# Patient Record
Sex: MALE | Race: WHITE | NOT HISPANIC OR LATINO | RURAL
[De-identification: names, ages, dates, MRNs, and addresses within clinical notes are randomized per-mention and may not be internally consistent; named-entity substitution may affect disease eponyms.]

---

## 2017-02-21 ENCOUNTER — OFFICE VISIT (OUTPATIENT)
Dept: CARDIOLOGY | Facility: CLINIC | Age: 77
End: 2017-02-21

## 2017-02-21 VITALS
HEIGHT: 71 IN | HEART RATE: 61 BPM | DIASTOLIC BLOOD PRESSURE: 79 MMHG | SYSTOLIC BLOOD PRESSURE: 190 MMHG | WEIGHT: 208.4 LBS | BODY MASS INDEX: 29.18 KG/M2

## 2017-02-21 DIAGNOSIS — I25.10 CORONARY ARTERY DISEASE INVOLVING NATIVE CORONARY ARTERY OF NATIVE HEART WITHOUT ANGINA PECTORIS: Primary | ICD-10-CM

## 2017-02-21 DIAGNOSIS — E78.5 DYSLIPIDEMIA: ICD-10-CM

## 2017-02-21 DIAGNOSIS — I10 ESSENTIAL HYPERTENSION: ICD-10-CM

## 2017-02-21 DIAGNOSIS — I42.9 CARDIOMYOPATHY (HCC): ICD-10-CM

## 2017-02-21 PROCEDURE — 99213 OFFICE O/P EST LOW 20 MIN: CPT | Performed by: INTERNAL MEDICINE

## 2017-02-21 RX ORDER — LISINOPRIL 20 MG/1
20 TABLET ORAL 2 TIMES DAILY
Qty: 60 TABLET | Refills: 6 | Status: SHIPPED | OUTPATIENT
Start: 2017-02-21 | End: 2017-09-20 | Stop reason: SDUPTHER

## 2017-02-21 RX ORDER — CARVEDILOL 25 MG/1
25 TABLET ORAL 2 TIMES DAILY
Qty: 60 TABLET | Refills: 6 | Status: SHIPPED | OUTPATIENT
Start: 2017-02-21 | End: 2017-09-20 | Stop reason: SDUPTHER

## 2017-02-21 RX ORDER — ATORVASTATIN CALCIUM 40 MG/1
40 TABLET, FILM COATED ORAL DAILY
Qty: 30 TABLET | Refills: 6 | Status: SHIPPED | OUTPATIENT
Start: 2017-02-21 | End: 2017-10-04 | Stop reason: SDUPTHER

## 2017-02-21 NOTE — PROGRESS NOTES
Encounter Date:02/21/2017      Patient ID: Zachary Melendez is a 76 y.o. male.    Chief Complaint: Coronary Artery Disease; Hypertension; and Hyperlipidemia    PROBLEM LIST:  1. Coronary artery disease  a. NSTEMI May 1st, 2016.  b. C 5/2/16: 80-90% LAD, 50% Cx with NL IFR, 30% RCA, EF 45%.  c. Stent of LAD with 2.5 x 18 FERNANDO  2. Cardiomyopathy  3. Hypertension  4. Dyslipidemia  5. GERD  6. Obesity  7. Nephrolothiasis  8. Surgical Hx:  a. TURP  b. Bilateral inguinal repair  c. Cholecsytectomy    History of Present Illness  Patient presents today for follow-up.  Since last visit has been feeling fine from a cardiac standpoint. States that he monitors his blood pressure infrequently at home. Denies chest pain, shortness of breath, leg swelling, palpitations, and syncope. Remains busy and active.    No Known Allergies      Current Outpatient Prescriptions:   •  ALLOPURINOL PO, Take 100 mg by mouth daily., Disp: , Rfl:   •  aspirin 325 MG tablet, Take 325 mg by mouth daily., Disp: , Rfl:   •  Atorvastatin Calcium (LIPITOR PO), Take 40 mg by mouth daily., Disp: , Rfl:   •  clopidogrel (PLAVIX) 75 MG tablet, Take 75 mg by mouth daily., Disp: , Rfl:   •  lisinopril (PRINIVIL,ZESTRIL) 10 MG tablet, Take 10 mg by mouth daily., Disp: , Rfl:   •  metoprolol tartrate (LOPRESSOR) 50 MG tablet, Take 50 mg by mouth 2 (two) times a day., Disp: , Rfl:   •  omeprazole (PriLOSEC) 20 MG capsule, Take 20 mg by mouth daily., Disp: , Rfl:     The following portions of the patient's history were reviewed and updated as appropriate: allergies, current medications, past family history, past medical history, past social history, past surgical history and problem list.    Review of Systems   Constitution: Negative for chills, fever, weight gain and weight loss.   Cardiovascular: Negative for chest pain, claudication, dyspnea on exertion, leg swelling, orthopnea, palpitations, paroxysmal nocturnal dyspnea and syncope.        No dizziness  "  Gastrointestinal: Negative for abdominal pain, constipation, diarrhea, nausea and vomiting.   Genitourinary:        No urinary symptoms   Neurological:        No symptoms of stroke.   All other systems reviewed and are negative.    Objective:     Blood pressure (!) 190/79, pulse 61, height 71\" (180.3 cm), weight 208 lb 6.4 oz (94.5 kg).  Repeat blood pressure measurement by, Michelle Frank MD, at 188/94      Physical Exam   Constitutional: He appears well-developed and well-nourished.   HENT:   HEENT exam unremarkable.   Neck: Neck supple. No JVD present.   No carotid bruits.   Cardiovascular: Normal rate, regular rhythm and normal heart sounds.    No murmur heard.  2 plus symmetric pulses.   Pulmonary/Chest: Breath sounds normal. He exhibits no tenderness.   Abdominal:   Abdomen benign.   Musculoskeletal: He exhibits no edema.   Neurological:   Neurological exam unremarkable.   Vitals reviewed.     Procedures       Assessment:      Diagnosis Plan   1. Coronary artery disease involving native coronary artery of native heart without angina pectoris     2. Essential hypertension, uncontrolled.      3. Dyslipidemia       Plan:   Increase lisinopril to 20 mg once daily.  Switch metoprolol to Coreg 25 mg twice a day.  Continue all other current medications.   FU in 3 MO, sooner as needed.  Thank you for allowing us to participate in the care of your patient.     Scribed for Michelle Frank MD by John Barnard. 2/21/2017  11:37 AM    I, Michelle Frank MD, personally performed the services described in this documentation as scribed by the above named individual in my presence, and it is both accurate and complete.  2/21/2017  6:16 PM        Please note that portions of this note may have been completed with a voice recognition program. Efforts were made to edit the dictations, but occasionally words are mistranscribed.        "

## 2017-03-06 ENCOUNTER — HOSPITAL ENCOUNTER (OUTPATIENT)
Dept: CARDIOLOGY | Facility: HOSPITAL | Age: 77
Discharge: HOME | End: 2017-03-06
Admitting: INTERNAL MEDICINE

## 2017-03-06 DIAGNOSIS — I42.9 CARDIOMYOPATHY (HCC): ICD-10-CM

## 2017-03-06 DIAGNOSIS — I25.10 CORONARY ARTERY DISEASE INVOLVING NATIVE CORONARY ARTERY OF NATIVE HEART WITHOUT ANGINA PECTORIS: ICD-10-CM

## 2017-03-06 PROCEDURE — 93306 TTE W/DOPPLER COMPLETE: CPT | Performed by: INTERNAL MEDICINE

## 2017-03-06 PROCEDURE — 93306 TTE W/DOPPLER COMPLETE: CPT

## 2017-03-08 LAB
BH CV ECHO MEAS - ACS: 2.1 CM
BH CV ECHO MEAS - AO ROOT AREA (BSA CORRECTED): 1.5
BH CV ECHO MEAS - AO ROOT AREA: 8.4 CM^2
BH CV ECHO MEAS - AO ROOT DIAM: 3.3 CM
BH CV ECHO MEAS - BSA(HAYCOCK): 2.2 M^2
BH CV ECHO MEAS - BSA: 2.1 M^2
BH CV ECHO MEAS - BZI_BMI: 29 KILOGRAMS/M^2
BH CV ECHO MEAS - BZI_METRIC_HEIGHT: 180.3 CM
BH CV ECHO MEAS - BZI_METRIC_WEIGHT: 94.3 KG
BH CV ECHO MEAS - CONTRAST EF 4CH: 49.6 ML/M^2
BH CV ECHO MEAS - EDV(CUBED): 164.5 ML
BH CV ECHO MEAS - EDV(MOD-SP4): 121 ML
BH CV ECHO MEAS - EDV(TEICH): 146.1 ML
BH CV ECHO MEAS - EF(CUBED): 71.7 %
BH CV ECHO MEAS - EF(MOD-SP4): 49.6 %
BH CV ECHO MEAS - EF(TEICH): 62.8 %
BH CV ECHO MEAS - ESV(CUBED): 46.6 ML
BH CV ECHO MEAS - ESV(MOD-SP4): 61 ML
BH CV ECHO MEAS - ESV(TEICH): 54.4 ML
BH CV ECHO MEAS - FS: 34.3 %
BH CV ECHO MEAS - IVS/LVPW: 0.87
BH CV ECHO MEAS - IVSD: 0.82 CM
BH CV ECHO MEAS - LA DIMENSION: 3.6 CM
BH CV ECHO MEAS - LA/AO: 1.1
BH CV ECHO MEAS - LV DIASTOLIC VOL/BSA (35-75): 56.4 ML/M^2
BH CV ECHO MEAS - LV MASS(C)D: 180.6 GRAMS
BH CV ECHO MEAS - LV MASS(C)DI: 84.2 GRAMS/M^2
BH CV ECHO MEAS - LV SYSTOLIC VOL/BSA (12-30): 28.4 ML/M^2
BH CV ECHO MEAS - LVIDD: 5.5 CM
BH CV ECHO MEAS - LVIDS: 3.6 CM
BH CV ECHO MEAS - LVLD AP4: 7.6 CM
BH CV ECHO MEAS - LVLS AP4: 6.9 CM
BH CV ECHO MEAS - LVOT AREA (M): 3.1 CM^2
BH CV ECHO MEAS - LVOT AREA: 3 CM^2
BH CV ECHO MEAS - LVOT DIAM: 2 CM
BH CV ECHO MEAS - LVPWD: 0.95 CM
BH CV ECHO MEAS - MV A MAX VEL: 67.6 CM/SEC
BH CV ECHO MEAS - MV DEC TIME: 0.32 SEC
BH CV ECHO MEAS - MV E MAX VEL: 65.2 CM/SEC
BH CV ECHO MEAS - MV E/A: 0.96
BH CV ECHO MEAS - PA ACC SLOPE: 659.8 CM/SEC^2
BH CV ECHO MEAS - PA ACC TIME: 0.12 SEC
BH CV ECHO MEAS - PA PR(ACCEL): 25.1 MMHG
BH CV ECHO MEAS - RAP SYSTOLE: 10 MMHG
BH CV ECHO MEAS - RVSP: 38.9 MMHG
BH CV ECHO MEAS - SI(CUBED): 55 ML/M^2
BH CV ECHO MEAS - SI(MOD-SP4): 28 ML/M^2
BH CV ECHO MEAS - SI(TEICH): 42.8 ML/M^2
BH CV ECHO MEAS - SV(CUBED): 117.9 ML
BH CV ECHO MEAS - SV(MOD-SP4): 60 ML
BH CV ECHO MEAS - SV(TEICH): 91.7 ML
BH CV ECHO MEAS - TR MAX VEL: 268.8 CM/SEC

## 2017-05-04 RX ORDER — CLOPIDOGREL BISULFATE 75 MG/1
75 TABLET ORAL DAILY
Qty: 90 TABLET | Refills: 1 | Status: SHIPPED | OUTPATIENT
Start: 2017-05-04 | End: 2017-11-14 | Stop reason: SDUPTHER

## 2017-09-20 RX ORDER — LISINOPRIL 20 MG/1
20 TABLET ORAL 2 TIMES DAILY
Qty: 60 TABLET | Refills: 6 | Status: SHIPPED | OUTPATIENT
Start: 2017-09-20 | End: 2018-11-05 | Stop reason: SDUPTHER

## 2017-09-20 RX ORDER — CARVEDILOL 25 MG/1
25 TABLET ORAL 2 TIMES DAILY
Qty: 60 TABLET | Refills: 6 | Status: SHIPPED | OUTPATIENT
Start: 2017-09-20 | End: 2018-04-23 | Stop reason: SDUPTHER

## 2017-10-04 RX ORDER — ATORVASTATIN CALCIUM 40 MG/1
40 TABLET, FILM COATED ORAL DAILY
Qty: 30 TABLET | Refills: 6 | Status: SHIPPED | OUTPATIENT
Start: 2017-10-04 | End: 2018-05-01 | Stop reason: SDUPTHER

## 2017-11-14 RX ORDER — CLOPIDOGREL BISULFATE 75 MG/1
75 TABLET ORAL DAILY
Qty: 90 TABLET | Refills: 1 | Status: SHIPPED | OUTPATIENT
Start: 2017-11-14 | End: 2018-01-23

## 2018-01-23 ENCOUNTER — OFFICE VISIT (OUTPATIENT)
Dept: CARDIOLOGY | Facility: CLINIC | Age: 78
End: 2018-01-23

## 2018-01-23 VITALS
DIASTOLIC BLOOD PRESSURE: 62 MMHG | HEIGHT: 71 IN | WEIGHT: 211 LBS | SYSTOLIC BLOOD PRESSURE: 140 MMHG | HEART RATE: 72 BPM | BODY MASS INDEX: 29.54 KG/M2

## 2018-01-23 DIAGNOSIS — I10 ESSENTIAL HYPERTENSION: ICD-10-CM

## 2018-01-23 DIAGNOSIS — I25.10 CORONARY ARTERY DISEASE INVOLVING NATIVE CORONARY ARTERY OF NATIVE HEART, ANGINA PRESENCE UNSPECIFIED: Primary | ICD-10-CM

## 2018-01-23 DIAGNOSIS — E78.5 DYSLIPIDEMIA: ICD-10-CM

## 2018-01-23 PROCEDURE — 99214 OFFICE O/P EST MOD 30 MIN: CPT | Performed by: INTERNAL MEDICINE

## 2018-01-23 RX ORDER — LISINOPRIL AND HYDROCHLOROTHIAZIDE 25; 20 MG/1; MG/1
1 TABLET ORAL DAILY PRN
COMMUNITY
End: 2019-02-05

## 2018-01-23 NOTE — PROGRESS NOTES
Encounter Date:01/23/2018      Patient ID: Zachary Melendez is a 77 y.o. male.    Chief Complaint: Coronary Artery Disease; Cardiomyopathy; Hypertension; and Dyslipidemia      PROBLEM LIST:  1. Coronary artery disease  a. NSTEMI May 1st, 2016.  b. LHC 5/2/16: 80-90% LAD, 50% Cx with NL IFR, 30% RCA, EF 45%.  c. Stent of LAD with 2.5 x 18 FERNANDO  d. Echo March 08, 2017: EF 61-65%.  Anterior leaflet thickening present.  2. Cardiomyopathy  3. Hypertension  4. Dyslipidemia  5. GERD  6. Obesity  7. Nephrolothiasis  8. Surgical Hx:  a. TURP  b. Bilateral inguinal repair  c. Cholecsytectomy    History of Present Illness  Patient presents today for follow-up with a history of CAD, cardiomyopathy, and cardiac risk factors. Complains of dizziness after he gets up in the morning and after he takes his medication.States he drinks coffee frequently. Experiences occasional nonspecific left upper extremity underarm pain exacerbated by changing position. Denies chest pain, shortness of breath, leg swelling, palpitations, and syncope. Remains busy and active with no limitations.    No Known Allergies      Current Outpatient Prescriptions:   •  ALLOPURINOL PO, Take 100 mg by mouth daily., Disp: , Rfl:   •  aspirin 325 MG tablet, Take 325 mg by mouth daily., Disp: , Rfl:   •  atorvastatin (LIPITOR) 40 MG tablet, Take 1 tablet by mouth Daily., Disp: 30 tablet, Rfl: 6  •  carvedilol (COREG) 25 MG tablet, Take 1 tablet by mouth 2 (Two) Times a Day., Disp: 60 tablet, Rfl: 6  •  lisinopril-hydrochlorothiazide (PRINZIDE,ZESTORETIC) 20-25 MG per tablet, Take 1 tablet by mouth Daily., Disp: , Rfl:   •  omeprazole (PriLOSEC) 20 MG capsule, Take 20 mg by mouth daily., Disp: , Rfl:   •  lisinopril (PRINIVIL,ZESTRIL) 20 MG tablet, Take 1 tablet by mouth 2 (Two) Times a Day., Disp: 60 tablet, Rfl: 6    The following portions of the patient's history were reviewed and updated as appropriate: allergies, current medications, past family history, past  "medical history, past social history, past surgical history and problem list.    ROS  Review of Systems   Constitution: Negative for chills, fever, weight gain and weight loss.   Cardiovascular: Negative for chest pain, claudication, dyspnea on exertion, leg swelling, orthopnea, palpitations, paroxysmal nocturnal dyspnea and syncope.        No dizziness   Gastrointestinal: Negative for abdominal pain, constipation, diarrhea, nausea and vomiting.   Genitourinary:        No urinary symptoms   Neurological:        No symptoms of stroke.   All other systems reviewed and are negative.    Objective:     Blood pressure 140/62, pulse 72, height 180.3 cm (71\"), weight 95.7 kg (211 lb).      Physical Exam  Constitutional: She appears well-developed and well-nourished.   HENT:   HEENT exam unremarkable.   Neck: Neck supple. No JVD present.   No carotid bruits.   Cardiovascular: Normal rate, regular rhythm and normal heart sounds.    No murmur heard.  2 plus symmetric pulses.   Pulmonary/Chest: Breath sounds normal. Does not exhibit tenderness.   Abdominal:   Abdomen benign.   Musculoskeletal: Does not exhibit edema.   Neurological:   Neurological exam unremarkable.   Vitals reviewed.    Lab Review:   Procedures       Assessment:      Diagnosis Plan   1. Coronary artery disease involving native coronary artery of native heart,stable, no angina, occasional arm pit pain appears muscular    2. Essential hypertension  Controlled, continue current medication regimen    3. Dyslipidemia  Well controlled.  Continue with statin therapy      Plan:   Stable cardiac status.  Continue current medications.   FU in 12 MO, sooner as needed.  Thank you for allowing us to participate in the care of your patient.     Michelle Frank MD, FAC, [facility]        Please note that portions of this note may have been completed with a voice recognition program. Efforts were made to edit the dictations, but occasionally words are mistranscribed.    "

## 2018-04-23 RX ORDER — CARVEDILOL 25 MG/1
25 TABLET ORAL 2 TIMES DAILY WITH MEALS
Qty: 60 TABLET | Refills: 6 | Status: SHIPPED | OUTPATIENT
Start: 2018-04-23 | End: 2019-02-05

## 2018-05-01 RX ORDER — ATORVASTATIN CALCIUM 40 MG/1
40 TABLET, FILM COATED ORAL DAILY
Qty: 30 TABLET | Refills: 6 | Status: SHIPPED | OUTPATIENT
Start: 2018-05-01 | End: 2018-11-02 | Stop reason: SDUPTHER

## 2018-11-02 RX ORDER — ATORVASTATIN CALCIUM 40 MG/1
40 TABLET, FILM COATED ORAL DAILY
Qty: 30 TABLET | Refills: 2 | Status: SHIPPED | OUTPATIENT
Start: 2018-11-02 | End: 2019-03-05 | Stop reason: SDUPTHER

## 2018-11-05 RX ORDER — LISINOPRIL 20 MG/1
20 TABLET ORAL DAILY
Qty: 30 TABLET | Refills: 6 | Status: SHIPPED | OUTPATIENT
Start: 2018-11-05 | End: 2019-02-06 | Stop reason: SDUPTHER

## 2019-02-05 ENCOUNTER — OFFICE VISIT (OUTPATIENT)
Dept: CARDIOLOGY | Facility: CLINIC | Age: 79
End: 2019-02-05

## 2019-02-05 VITALS
DIASTOLIC BLOOD PRESSURE: 80 MMHG | BODY MASS INDEX: 28.98 KG/M2 | SYSTOLIC BLOOD PRESSURE: 174 MMHG | OXYGEN SATURATION: 96 % | HEART RATE: 62 BPM | HEIGHT: 71 IN | WEIGHT: 207 LBS

## 2019-02-05 DIAGNOSIS — I25.10 CORONARY ARTERY DISEASE INVOLVING NATIVE CORONARY ARTERY OF NATIVE HEART WITHOUT ANGINA PECTORIS: Primary | ICD-10-CM

## 2019-02-05 DIAGNOSIS — E78.2 MIXED HYPERLIPIDEMIA: ICD-10-CM

## 2019-02-05 DIAGNOSIS — I10 ESSENTIAL HYPERTENSION: ICD-10-CM

## 2019-02-05 PROBLEM — K21.9 GERD (GASTROESOPHAGEAL REFLUX DISEASE): Status: ACTIVE | Noted: 2019-02-05

## 2019-02-05 PROCEDURE — 99214 OFFICE O/P EST MOD 30 MIN: CPT | Performed by: PHYSICIAN ASSISTANT

## 2019-02-05 RX ORDER — METOPROLOL SUCCINATE 25 MG/1
25 TABLET, EXTENDED RELEASE ORAL DAILY
Qty: 30 TABLET | Refills: 11 | Status: SHIPPED | OUTPATIENT
Start: 2019-02-05 | End: 2019-09-24 | Stop reason: ALTCHOICE

## 2019-02-05 NOTE — PROGRESS NOTES
"        Encounter Date:02/05/2019      Patient ID: Zachary Melendez is a 78 y.o. male.    Chief Complaint: Coronary Artery Disease      PROBLEM LIST:  1. Coronary artery disease:  a. NSTEMI, 05/01/2016.  b. LHC, 05/02/2016: 80-90% LAD treated with 2.5 x 18 FERNANDO, 50% Cx with normal IFR, 30% RCA. EF 45%.  c. Echo, 03/08/2017: EF 61-65%. Anterior leaflet thickening present. No significant VHD.  2. Cardiomyopathy.  3. Hypertension.  4. Dyslipidemia.  5. GERD.  6. Obesity.  7. Nephrolithiasis.  8. Surgical Hx:  a. TURP.  b. Bilateral inguinal repair.  c. Cholecystectomy.    History of Present Illness  Patient presents today for annual follow-up with a history of coronary artery disease and cardiac risk factors.   He has no current complaint exertional chest pain or dyspnea, denies orthopnea, PND, claudication, lower extremity edema.  He has no awareness of tachycardia arrhythmias, no dizziness or syncope.  He's had a few issues with his medications.  He discontinued carvedilol as he perceived to increase his heart rate and blood pressure.  He started taking it again once a day approximately 3 days ago without resumption of symptoms.  I'm somewhat confused about his regimen of lisinopril versus Zestoretic.  Apparently he was prescribed Zestoretic 20-25 milligrams daily by primary care however he is only taking this when necessary states that it dropped his blood pressure \"too low\" and is only been taken a few times.  It appears that his lisinopril 20 mg was decreased from 20 mg every 12 hours to 20 mg daily.  He's been checking his multiple blood pressure multiple times per day most frequently in the morning prior to taking his a.m. doses.  He reports heart rates at home ranging between 60 and 80 bpm. He is not aware of having a lipid panel within the past year.    Current Outpatient Medications:   •  ALLOPURINOL PO, Take 100 mg by mouth daily., Disp: , Rfl:   •  aspirin 325 MG tablet, Take 325 mg by mouth daily., Disp: , Rfl: " "  •  atorvastatin (LIPITOR) 40 MG tablet, Take 1 tablet by mouth Daily., Disp: 30 tablet, Rfl: 2  •  lisinopril (PRINIVIL,ZESTRIL) 20 MG tablet, Take 1 tablet by mouth Daily., Disp: 30 tablet, Rfl: 6  •  lisinopril-hydrochlorothiazide (PRINZIDE,ZESTORETIC) 20-25 MG per tablet, Take 1 tablet by mouth Daily As Needed., Disp: , Rfl:   •  omeprazole (PriLOSEC) 20 MG capsule, Take 20 mg by mouth daily., Disp: , Rfl:     The following portions of the patient's history were reviewed and updated as appropriate: allergies, current medications, past family history, past medical history, past social history, past surgical history and problem list.    ROS  Review of Systems   Constitution: Negative for chills, fatigue, fever, generalized weakness, weight gain and weight loss.   Cardiovascular: Negative for chest pain, claudication, dyspnea on exertion, leg swelling, orthopnea, palpitations, paroxysmal nocturnal dyspnea and syncope.   Respiratory: Negative for cough, shortness of breath, snoring, and wheezing.  HENT: Negative for ear pain, nosebleeds, and tinnitus.  Gastrointestinal: Negative for abdominal pain, constipation, diarrhea, nausea and vomiting.   Genitourinary: No urinary symptoms   Neurological: Negative for dizziness, headaches, loss of balance, numbness, and symptoms of stroke.  Psychiatric: Normal mental status.     All other systems reviewed and are negative.    Objective:     /80 (BP Location: Right arm, Patient Position: Sitting)   Pulse 62   Ht 180.3 cm (71\")   Wt 93.9 kg (207 lb)   SpO2 96%   BMI 28.87 kg/m²          Physical Exam  Constitutional: Patient appears well-developed and well-nourished.   HENT: HEENT exam unremarkable.   Neck: Neck supple. No JVD present. No carotid bruits.   Cardiovascular: Normal rate, regular rhythm and normal heart sounds. No murmur heard.   2+ symmetric pulses.   Pulmonary/Chest: Breath sounds normal. Does not exhibit tenderness.   Abdominal: Abdomen benign. "   Musculoskeletal: Does not exhibit edema.   Neurological: Neurological exam unremarkable.   Vitals reviewed.    Lab Review:   Lab Results   Component Value Date    GLUCOSE 97 05/03/2016    BUN 15 05/03/2016    CREATININE 1.0 05/03/2016    K 3.7 05/03/2016    CO2 25 05/03/2016    CALCIUM 9.1 05/03/2016     Lab Results   Component Value Date    CHLPL 153 05/02/2016    TRIG 74 05/02/2016    HDL 32 (L) 05/02/2016     05/02/2016      Lab Results   Component Value Date    WBC 10.02 05/03/2016    HGB 16.0 05/03/2016    HCT 46.4 05/03/2016    MCV 88.2 05/03/2016     05/03/2016       Procedures       Assessment:      Diagnosis Plan   1. Coronary artery disease involving native coronary artery of native heart without angina pectoris  Stable without current anginal symptoms    2. Essential hypertension  1.  Discontinue carvedilol   2.  Discontinue Zestoretic 20-25 milligrams daily   3.  Start Toprol-XL 25 mg daily   4.  Resume lisinopril 20 mg every 12 hours (this has worked well for him in the past).    5.  I've asked him to check his blood pressure once per day midday for 2 weeks and call our office back for possible titration    3. Mixed hyperlipidemia  Lipid Panel    Comprehensive Metabolic Panel     Plan:     Stable cardiac status.  Continue current medications.   FU in 6 MO, sooner as needed.  Thank you for allowing us to participate in the care of your patient.     Electronically signed by KARELY Nance, 02/05/19, 10:59 AM.    Please note that portions of this note may have been completed with a voice recognition program. Efforts were made to edit the dictations, but occasionally words are mistranscribed.

## 2019-02-06 RX ORDER — LISINOPRIL 20 MG/1
20 TABLET ORAL DAILY
Qty: 30 TABLET | Refills: 6 | Status: SHIPPED | OUTPATIENT
Start: 2019-02-06 | End: 2021-04-08 | Stop reason: SDUPTHER

## 2019-03-05 RX ORDER — ATORVASTATIN CALCIUM 40 MG/1
40 TABLET, FILM COATED ORAL DAILY
Qty: 30 TABLET | Refills: 2 | Status: SHIPPED | OUTPATIENT
Start: 2019-03-05 | End: 2019-06-06 | Stop reason: SDUPTHER

## 2019-06-06 RX ORDER — ATORVASTATIN CALCIUM 40 MG/1
40 TABLET, FILM COATED ORAL DAILY
Qty: 30 TABLET | Refills: 2 | Status: SHIPPED | OUTPATIENT
Start: 2019-06-06 | End: 2019-09-04 | Stop reason: SDUPTHER

## 2019-09-04 RX ORDER — ATORVASTATIN CALCIUM 40 MG/1
40 TABLET, FILM COATED ORAL DAILY
Qty: 30 TABLET | Refills: 11 | Status: SHIPPED | OUTPATIENT
Start: 2019-09-04

## 2019-09-24 ENCOUNTER — OFFICE VISIT (OUTPATIENT)
Dept: CARDIOLOGY | Facility: CLINIC | Age: 79
End: 2019-09-24

## 2019-09-24 VITALS
WEIGHT: 208 LBS | DIASTOLIC BLOOD PRESSURE: 80 MMHG | HEART RATE: 79 BPM | SYSTOLIC BLOOD PRESSURE: 178 MMHG | BODY MASS INDEX: 29.12 KG/M2 | HEIGHT: 71 IN

## 2019-09-24 DIAGNOSIS — I10 ESSENTIAL HYPERTENSION: ICD-10-CM

## 2019-09-24 DIAGNOSIS — E78.2 MIXED HYPERLIPIDEMIA: ICD-10-CM

## 2019-09-24 DIAGNOSIS — I25.10 CORONARY ARTERY DISEASE INVOLVING NATIVE CORONARY ARTERY OF NATIVE HEART WITHOUT ANGINA PECTORIS: Primary | ICD-10-CM

## 2019-09-24 PROCEDURE — 99214 OFFICE O/P EST MOD 30 MIN: CPT | Performed by: INTERNAL MEDICINE

## 2019-09-24 RX ORDER — LISINOPRIL AND HYDROCHLOROTHIAZIDE 25; 20 MG/1; MG/1
1 TABLET ORAL EVERY EVENING
COMMUNITY
End: 2022-09-27

## 2019-09-24 RX ORDER — CARVEDILOL 25 MG/1
25 TABLET ORAL 2 TIMES DAILY
Qty: 180 TABLET | Refills: 3 | Status: SHIPPED | OUTPATIENT
Start: 2019-09-24 | End: 2020-10-02

## 2019-09-24 RX ORDER — AMLODIPINE BESYLATE 5 MG/1
5 TABLET ORAL DAILY
Qty: 90 TABLET | Refills: 3 | Status: SHIPPED | OUTPATIENT
Start: 2019-09-24 | End: 2020-09-17

## 2019-09-24 NOTE — PROGRESS NOTES
[facility] Cardiology    Encounter Date:09/24/2019        Patient ID: Zachary Melendez is a 78 y.o. male.    PCP: Nerissa Cortez MD     Chief Complaint: Coronary Artery Disease      PROBLEM LIST:  1. Coronary artery disease:  a. NSTEMI, 05/01/2016.  b. LHC, 05/02/2016: 80-90% LAD treated with 2.5 x 18 FERNANDO, 50% Cx with normal IFR, 30% RCA. EF 45%.  c. Echo, 03/08/2017: EF 61-65%. Anterior leaflet thickening present. No significant VHD.  2. Cardiomyopathy:  a. EF 45% at the time of NSTEMI in 2016, with subsequent normalization.  3. Hypertension.  4. Dyslipidemia.  5. GERD.  6. Obesity.  7. Nephrolithiasis.  8. Surgical Hx:  a. TURP.  b. Bilateral inguinal repair.  c. Cholecystectomy.    History of Present Illness  Patient presents today for 6 month follow-up with a history of coronary artery disease and cardiac risk factors. He reports a primary care visit yesterday, at which time his blood pressure was in the 200s/110. Patient states his PCP started on a new medication but has not picked up the prescription yet, and is unsure what this medication is. He has been taking his lisinopril 20 mg nightly, and lisinopril-HCT 20-25 mg daily in the morning. Denies chest pain, shortness of breath, leg swelling, palpitations, and syncope. Remains busy and active with no significant limitations.    No Known Allergies      Current Outpatient Medications:   •  ALLOPURINOL PO, Take 100 mg by mouth daily., Disp: , Rfl:   •  aspirin 325 MG tablet, Take 325 mg by mouth daily., Disp: , Rfl:   •  atorvastatin (LIPITOR) 40 MG tablet, Take 1 tablet by mouth Daily., Disp: 30 tablet, Rfl: 11  •  lisinopril (PRINIVIL,ZESTRIL) 20 MG tablet, Take 1 tablet by mouth Daily. (Patient taking differently: Take 40 mg by mouth Daily.), Disp: 30 tablet, Rfl: 6  •  lisinopril-hydrochlorothiazide (PRINZIDE,ZESTORETIC) 20-25 MG per tablet, Take 1 tablet by mouth As Needed., Disp: , Rfl:   •  metoprolol succinate XL  "(TOPROL-XL) 25 MG 24 hr tablet, Take 1 tablet by mouth Daily., Disp: 30 tablet, Rfl: 11  •  omeprazole (PriLOSEC) 20 MG capsule, Take 20 mg by mouth daily., Disp: , Rfl:     The following portions of the patient's history were reviewed and updated as appropriate: allergies, current medications, past family history, past medical history, past social history, past surgical history and problem list.    ROS  Review of Systems   Constitution: Negative for chills, fatigue, fever, generalized weakness, weight gain and weight loss.   Cardiovascular: Negative for chest pain, claudication, dyspnea on exertion, leg swelling, orthopnea, palpitations, paroxysmal nocturnal dyspnea and syncope.   Respiratory: Negative for cough, shortness of breath, and wheezing.  HENT: Negative for ear pain, nosebleeds, and tinnitus.  Gastrointestinal: Negative for abdominal pain, constipation, diarrhea, nausea and vomiting.   Genitourinary: No urinary symptoms   Musculoskeletal: Negative for muscle cramps.  Neurological: Negative for dizziness, headaches, loss of balance, numbness, and symptoms of stroke.  Psychiatric: Normal mental status.     All other systems reviewed and are negative.    Objective:     /80 (BP Location: Left arm, Patient Position: Sitting)   Pulse 79   Ht 180.3 cm (71\")   Wt 94.3 kg (208 lb)   BMI 29.01 kg/m²    Repeat blood pressure measurement by, Michelle Frank MD, at 176/90      Physical Exam  Constitutional: Patient appears well-developed and well-nourished.   HENT: HEENT exam unremarkable.   Neck: Neck supple. No JVD present. No carotid bruits.   Cardiovascular: Normal rate, regular rhythm and normal heart sounds. No murmur heard.   2+ symmetric pulses.   Pulmonary/Chest: Breath sounds normal. Does not exhibit tenderness.   Abdominal: Abdomen benign.   Musculoskeletal: Does not exhibit edema.   Neurological: Neurological exam unremarkable.   Vitals reviewed.    Lab Review:   Last labs, 02/08/2019:  Normal " CMP  FLP:     TG 57  HDL 41  LDL 49     Procedures       Assessment:      Diagnosis Plan   1. Coronary artery disease involving native coronary artery of native heart without angina pectoris  Stable, continue current medications.   2. Essential hypertension  Poor control. DC metoprolol and start carvedilol 25 mg BID and amlodipine 5 mg daily. Continue all other current medications. Monitor BP at home, goal BP < 140/85. If BP remains higher than this in a week, call the office for further titration.    3. Mixed hyperlipidemia  Well-controlled, continue atorvastatin 40 mg.     Plan:   DC metoprolol and start carvedilol 25 mg BID and amlodipine 5 mg daily for better control of blood pressure. Continue all other current medications. Monitor BP at home, goal BP < 140/85. If BP remains higher than this in a week, call the office for further titration.   Limit salt/sodium consumption.  FU in 3 MO, sooner as needed.  Thank you for allowing us to participate in the care of your patient.     Scribed for Michelle Frank MD by Maria R Emery. 9/24/2019  9:13 AM      I, Michelle Frank MD, personally performed the services described in this documentation as scribed by the above named individual in my presence, and it is both accurate and complete.  9/24/2019  9:38 AM        Please note that portions of this note may have been completed with a voice recognition program. Efforts were made to edit the dictations, but occasionally words are mistranscribed.

## 2020-01-21 ENCOUNTER — OFFICE VISIT (OUTPATIENT)
Dept: CARDIOLOGY | Facility: CLINIC | Age: 80
End: 2020-01-21

## 2020-01-21 VITALS
BODY MASS INDEX: 29.54 KG/M2 | SYSTOLIC BLOOD PRESSURE: 180 MMHG | WEIGHT: 211 LBS | DIASTOLIC BLOOD PRESSURE: 80 MMHG | HEIGHT: 71 IN | HEART RATE: 67 BPM

## 2020-01-21 DIAGNOSIS — E78.2 MIXED HYPERLIPIDEMIA: Primary | ICD-10-CM

## 2020-01-21 DIAGNOSIS — I10 ESSENTIAL HYPERTENSION: ICD-10-CM

## 2020-01-21 DIAGNOSIS — I25.10 CORONARY ARTERY DISEASE INVOLVING NATIVE CORONARY ARTERY OF NATIVE HEART WITHOUT ANGINA PECTORIS: ICD-10-CM

## 2020-01-21 PROCEDURE — 99214 OFFICE O/P EST MOD 30 MIN: CPT | Performed by: PHYSICIAN ASSISTANT

## 2020-01-21 NOTE — PROGRESS NOTES
[facility] Cardiology    Encounter Date:2020        Patient ID: Zachary Melendez is a marreid 79 y.o. male.  : 1940     PCP: Nerissa Cortez MD     Chief Complaint: Coronary artery disease involving native coronary artery of       PROBLEM LIST:  1. Coronary artery disease:  a. NSTEMI, 2016.  b. LHC, 2016: 80-90% LAD treated with 2.5 x 18 FERNANDO, 50% Cx with normal IFR, 30% RCA. EF 45%.  c. Echo, 2017: EF 61-65%. Anterior leaflet thickening present. No significant VHD.  2. Cardiomyopathy:  a. EF 45% at the time of NSTEMI in , with subsequent normalization.  b. Echo, 2017: EF 61-65%.  3. Hypertension.  4. Dyslipidemia.  5. GERD.  6. Obesity.  7. Nephrolithiasis.  8. Surgical Hx:  a. TURP.  b. Bilateral inguinal repair.  c. Cholecystectomy.    History of Present Illness  Patient presents today for 3 month follow-up with a history of coronary artery disease and cardiac risk factors. Since last visit, he is done well.  He has no complaint of exertional chest pain or dyspnea orthopnea PND no claudication or lower extremity edema.  He has no awareness of tachyarrhythmias, no dizziness or syncope.  He states he forgot to take his blood pressure medicine this morning.  He checks his blood pressure regularly at home and states it generally runs about 116 to 120 mmHg systolic with diastolics generally in the 60s.  He has not had his lipids checked in the past year.    No Known Allergies       Current Outpatient Medications:   •  ALLOPURINOL PO, Take 100 mg by mouth daily., Disp: , Rfl:   •  amLODIPine (NORVASC) 5 MG tablet, Take 1 tablet by mouth Daily., Disp: 90 tablet, Rfl: 3  •  aspirin 325 MG tablet, Take 325 mg by mouth daily., Disp: , Rfl:   •  atorvastatin (LIPITOR) 40 MG tablet, Take 1 tablet by mouth Daily., Disp: 30 tablet, Rfl: 11  •  carvedilol (COREG) 25 MG tablet, Take 1 tablet by mouth 2 (Two) Times a Day., Disp: 180 tablet, Rfl: 3  •   "lisinopril (PRINIVIL,ZESTRIL) 20 MG tablet, Take 1 tablet by mouth Daily. (Patient taking differently: Take 40 mg by mouth Daily.), Disp: 30 tablet, Rfl: 6  •  lisinopril-hydrochlorothiazide (PRINZIDE,ZESTORETIC) 20-25 MG per tablet, Take 1 tablet by mouth As Needed., Disp: , Rfl:   •  omeprazole (PriLOSEC) 20 MG capsule, Take 20 mg by mouth daily., Disp: , Rfl:     The following portions of the patient's history were reviewed and updated as appropriate: allergies, current medications, past family history, past medical history, past social history, past surgical history and problem list.    ROS  Review of Systems   Constitution: Negative for chills, fatigue, fever, generalized weakness.   Cardiovascular: Negative for chest pain, claudication, dyspnea on exertion, leg swelling, orthopnea, palpitations, paroxysmal nocturnal dyspnea and syncope.   Respiratory: Negative for cough, shortness of breath, and wheezing.  HENT: Negative for ear pain, nosebleeds, and tinnitus.  Gastrointestinal: Negative for abdominal pain, constipation, diarrhea, nausea and vomiting.   Genitourinary: No urinary symptoms.  Musculoskeletal: Negative for muscle cramps.  Neurological: Negative for dizziness, headaches, loss of balance, numbness, and symptoms of stroke.  Psychiatric: Normal mental status.     All other systems reviewed and are negative.    Objective:     /80 (BP Location: Right arm, Patient Position: Sitting)   Pulse 67   Ht 180.3 cm (71\")   Wt 95.7 kg (211 lb)   BMI 29.43 kg/m²          Physical Exam  Constitutional: Patient appears well-developed and well-nourished.   HENT: HEENT exam unremarkable.   Neck: Neck supple. No JVD present. No carotid bruits.   Cardiovascular: Normal rate, regular rhythm and normal heart sounds. No murmur heard.   2+ symmetric pulses.   Pulmonary/Chest: Breath sounds normal. Does not exhibit tenderness.   Abdominal: Abdomen benign.   Musculoskeletal: Does not exhibit edema.   Neurological: " Neurological exam unremarkable.   Vitals reviewed.    Lab Review:     Last labs, 02/08/2019:  Normal CMP  FLP:     TG 57  HDL 41  LDL 49      Procedures       Assessment:      Diagnosis Plan   1. Mixed hyperlipidemia  Lipid panel    Comprehensive metabolic panel   2. Coronary artery disease involving native coronary artery of native heart without angina pectoris   stable without current anginal symptoms, continue current medical regimen   3. Essential hypertension   elevated this morning due to forgetting to take morning medications.  Overall appear to be well managed at home.  Continue current medical regimen.     Plan:     Stable cardiac status.  Continue current medications.   FU in 6 MO, sooner as needed.  Thank you for allowing us to participate in the care of your patient.         Electronically signed by KARELY Nance, 01/21/20, 9:19 AM.      Please note that portions of this note may have been completed with a voice recognition program. Efforts were made to edit the dictations, but occasionally words are mistranscribed.

## 2020-02-06 ENCOUNTER — TELEPHONE (OUTPATIENT)
Dept: CARDIOLOGY | Facility: CLINIC | Age: 80
End: 2020-02-06

## 2020-02-06 NOTE — TELEPHONE ENCOUNTER
----- Message from KARELY Lamb sent at 2/6/2020  8:33 AM EST -----  Regarding: Zachary Melendez [[id number]]   Please let the patient know that his labs are excellent I like him to continue his current medicines at the current doses thank you      ----- Message -----  From: Lex Thompson Incoming  Sent: 1/30/2020  10:26 AM EST  To: KARELY Lamb

## 2020-09-17 RX ORDER — AMLODIPINE BESYLATE 5 MG/1
TABLET ORAL
Qty: 90 TABLET | Refills: 3 | Status: SHIPPED | OUTPATIENT
Start: 2020-09-17 | End: 2020-10-20 | Stop reason: SDUPTHER

## 2020-10-02 RX ORDER — CARVEDILOL 25 MG/1
TABLET ORAL
Qty: 180 TABLET | Refills: 3 | Status: SHIPPED | OUTPATIENT
Start: 2020-10-02 | End: 2021-10-19 | Stop reason: SDUPTHER

## 2020-10-20 ENCOUNTER — OFFICE VISIT (OUTPATIENT)
Dept: CARDIOLOGY | Facility: CLINIC | Age: 80
End: 2020-10-20

## 2020-10-20 VITALS
HEIGHT: 71 IN | SYSTOLIC BLOOD PRESSURE: 158 MMHG | OXYGEN SATURATION: 98 % | BODY MASS INDEX: 29.68 KG/M2 | DIASTOLIC BLOOD PRESSURE: 78 MMHG | HEART RATE: 66 BPM | WEIGHT: 212 LBS

## 2020-10-20 DIAGNOSIS — I10 ESSENTIAL HYPERTENSION: ICD-10-CM

## 2020-10-20 DIAGNOSIS — E78.2 MIXED HYPERLIPIDEMIA: ICD-10-CM

## 2020-10-20 DIAGNOSIS — I25.10 CORONARY ARTERY DISEASE INVOLVING NATIVE CORONARY ARTERY OF NATIVE HEART WITHOUT ANGINA PECTORIS: Primary | ICD-10-CM

## 2020-10-20 PROCEDURE — 99214 OFFICE O/P EST MOD 30 MIN: CPT | Performed by: INTERNAL MEDICINE

## 2020-10-20 RX ORDER — AMLODIPINE BESYLATE 10 MG/1
10 TABLET ORAL DAILY
Qty: 90 TABLET | Refills: 3 | Status: SHIPPED | OUTPATIENT
Start: 2020-10-20

## 2020-10-20 NOTE — PROGRESS NOTES
[facility] Cardiology    Encounter Date: 10/20/2020    Patient ID: Zachary Melendez is a 80 y.o. male.  : 1940     PCP: Nerissa Cortez MD       Chief Complaint: Mixed hyperlipidemia      PROBLEM LIST:  1. Coronary artery disease:  a. NSTEMI, 2016.  b. LHC, 2016: 80-90% LAD treated with 2.5 x 18 FERNANDO, 50% Cx with normal IFR, 30% RCA. EF 45%.  c. Echo, 2017: EF 61-65%. Anterior leaflet thickening present. No significant VHD.  2. Cardiomyopathy:  a. EF 45% at the time of NSTEMI in , with subsequent normalization.  b. Echo, 2017: EF 61-65%.  3. Hypertension.  4. Dyslipidemia.  5. GERD.  6. Obesity.  7. Nephrolithiasis.  8. Surgical Hx:  a. TURP.  b. Bilateral inguinal repair.  c. Cholecystectomy.    History of Present Illness  Patient presents today for a follow-up with a history of coronary artery disease and cardiac risk factors. Since last visit, he has been feeling well overall from a cardiovascular standpoint. He is accompanied to the office with his son in law. He has been staying active by working around the house. He reportedly took his blood pressure medications this morning around 7 AM. He checks his blood pressure at home regularly and it is typically in the 150's/70's.Patient denies chest pain, shortness of breath, palpitations, edema, dizziness, and syncope. He denies smoking.       No Known Allergies      Current Outpatient Medications:   •  ALLOPURINOL PO, Take 100 mg by mouth daily., Disp: , Rfl:   •  amLODIPine (NORVASC) 10 MG tablet, Take 1 tablet by mouth Daily., Disp: 90 tablet, Rfl: 3  •  aspirin 325 MG tablet, Take 325 mg by mouth daily., Disp: , Rfl:   •  atorvastatin (LIPITOR) 40 MG tablet, Take 1 tablet by mouth Daily., Disp: 30 tablet, Rfl: 11  •  carvedilol (COREG) 25 MG tablet, TAKE ONE TABLET BY MOUTH TWICE A DAY, Disp: 180 tablet, Rfl: 3  •  lisinopril (PRINIVIL,ZESTRIL) 20 MG tablet, Take 1 tablet by mouth Daily., Disp:  "30 tablet, Rfl: 6  •  lisinopril-hydrochlorothiazide (PRINZIDE,ZESTORETIC) 20-25 MG per tablet, Take 1 tablet by mouth As Needed., Disp: , Rfl:   •  omeprazole (PriLOSEC) 20 MG capsule, Take 20 mg by mouth daily., Disp: , Rfl:     The following portions of the patient's history were reviewed and updated as appropriate: allergies, current medications, past family history, past medical history, past social history, past surgical history and problem list.    ROS  Review of Systems   Constitution: Negative for chills, fever, fatigue, generalized weakness.   Cardiovascular: Negative for chest pain, dyspnea on exertion, leg swelling, palpitations, orthopnea, and syncope.   Respiratory: Negative for cough, shortness of breath, and wheezing.  HENT: Negative for ear pain, nosebleeds, and tinnitus.  Gastrointestinal: Negative for abdominal pain, constipation, diarrhea, nausea and vomiting.   Genitourinary: No urinary symptoms.  Musculoskeletal: Negative for muscle cramps.  Neurological: Negative for dizziness, headaches, loss of balance, numbness, and symptoms of stroke.  Psychiatric: Normal mental status.     All other systems reviewed and are negative.        Objective:     /78 (BP Location: Left arm, Patient Position: Sitting)   Pulse 66   Ht 180.3 cm (71\")   Wt 96.2 kg (212 lb)   SpO2 98%   BMI 29.57 kg/m²    Repeat BP: 160/76    Physical Exam  Constitutional: Patient appears well-developed and well-nourished.   HENT: HEENT exam unremarkable.   Neck: Neck supple. No JVD present. No carotid bruits.   Cardiovascular: Normal rate, regular rhythm and normal heart sounds. No murmur heard.   2+ symmetric pulses.   Pulmonary/Chest: Breath sounds normal. Does not exhibit tenderness.   Abdominal: Abdomen benign.   Musculoskeletal: Does not exhibit edema.   Neurological: Neurological exam unremarkable.   Vitals reviewed.    Data Review:   Lab date: 01/22/2020  • FLP: TC 94, TG 58, HDL 32, LDL 51  • CMP: Glu 108, BUN 21, " Creat 0.93, eGFR >60, Na 142, K 4.7, Cl 106, CO2 29.2, Ca 9, Alk Phos 149, AST 23, ALT 26    Procedures       Assessment:      Diagnosis Plan   1. Coronary artery disease involving native coronary artery of native heart without angina pectoris  Stable without current anginal symptoms; continue current medications.    2. Essential hypertension   not very well controlled. Increase amlodipine from 5 mg to 10 mg daily; continue all other medications.    3. Mixed hyperlipidemia  Well-controlled; continue atorvastatin 40 mg nightly.      Plan:   Continue monitoring blood pressure at home and report back to Dr. Cortez with a list of readings./80 discussed.  Increase amlodipine from 5 mg to 10 mg daily.   Continue all other current medications.   FU in 12 MO, sooner as needed.  Thank you for allowing us to participate in the care of your patient.     Scribed for Michelle Frank MD by Marizol Tripathi. 10/20/2020  09:58 EDT      I, Michelle Frank MD, personally performed the services described in this documentation as scribed by the above named individual in my presence, and it is both accurate and complete.  10/20/2020  10:09 EDT        Please note that portions of this note may have been completed with a voice recognition program. Efforts were made to edit the dictations, but occasionally words are mistranscribed.

## 2021-03-02 ENCOUNTER — TELEPHONE (OUTPATIENT)
Dept: CARDIOLOGY | Facility: CLINIC | Age: 81
End: 2021-03-02

## 2021-03-02 NOTE — TELEPHONE ENCOUNTER
Received VM from Danay at pt PCP office stating pt having issues w afib and SOA today during visit. Requesting sooner apt. Attempted to return call to [phone], no answer. STEFFANY w call back number.

## 2021-03-23 ENCOUNTER — OFFICE VISIT (OUTPATIENT)
Dept: CARDIOLOGY | Facility: CLINIC | Age: 81
End: 2021-03-23

## 2021-03-23 VITALS
HEART RATE: 72 BPM | HEIGHT: 71 IN | BODY MASS INDEX: 28.7 KG/M2 | SYSTOLIC BLOOD PRESSURE: 142 MMHG | OXYGEN SATURATION: 97 % | WEIGHT: 205 LBS | DIASTOLIC BLOOD PRESSURE: 74 MMHG

## 2021-03-23 DIAGNOSIS — I25.10 CORONARY ARTERY DISEASE INVOLVING NATIVE CORONARY ARTERY OF NATIVE HEART WITHOUT ANGINA PECTORIS: Primary | ICD-10-CM

## 2021-03-23 DIAGNOSIS — E78.2 MIXED HYPERLIPIDEMIA: ICD-10-CM

## 2021-03-23 DIAGNOSIS — I10 ESSENTIAL HYPERTENSION: ICD-10-CM

## 2021-03-23 DIAGNOSIS — R00.2 PALPITATIONS: ICD-10-CM

## 2021-03-23 PROCEDURE — 99214 OFFICE O/P EST MOD 30 MIN: CPT | Performed by: INTERNAL MEDICINE

## 2021-03-23 RX ORDER — FUROSEMIDE 20 MG/1
20 TABLET ORAL AS NEEDED
COMMUNITY
End: 2022-06-28

## 2021-03-23 NOTE — PROGRESS NOTES
[facility] Cardiology    Encounter Date: 2021    Patient ID: Zachary Melendez is a 80 y.o. male.  : 1940     PCP: Nerissa Cortez MD       Chief Complaint: Coronary Artery Disease and Mixed Hyperlipidemia      PROBLEM LIST:  1. Coronary artery disease:  a. NSTEMI, 2016.  b. LHC, 2016: 80-90% LAD treated with 2.5 x 18 FERNANDO, 50% Cx with normal IFR, 30% RCA. EF 45%.  c. Echo, 2017: EF 61-65%. Anterior leaflet thickening present. No significant VHD.  2. Cardiomyopathy:  a. EF 45% at the time of NSTEMI in , with subsequent normalization.  b. Echo, 2017: EF 61-65%.  3. Hypertension.  4. Dyslipidemia.  5. GERD.  6. Obesity.  7. Nephrolithiasis.  8. Surgical Hx:  a. TURP.  b. Bilateral inguinal repair.  c. Cholecystectomy.    History of Present Illness  Patient presents today for a follow-up with a history of coronary artery disease and cardiac risk factors.  Patient states that a few weeks ago he was been experiencing palpitations and he thinks his heart was out of rhythm. He explains the palpitations as his heart skipping beats.  He also had some shortness of breath and saw his PCP, subsequently states that he was prescribed Lasix 20 mg and it resolved his symptoms. He is no longer experiencing palpitations and has not to take Lasix any longer.. Patient denies chest pain, shortness of breath, edema, dizziness, and syncope.       No Known Allergies      Current Outpatient Medications:   •  ALLOPURINOL PO, Take 100 mg by mouth daily., Disp: , Rfl:   •  amLODIPine (NORVASC) 10 MG tablet, Take 1 tablet by mouth Daily., Disp: 90 tablet, Rfl: 3  •  aspirin 325 MG tablet, Take 325 mg by mouth daily., Disp: , Rfl:   •  atorvastatin (LIPITOR) 40 MG tablet, Take 1 tablet by mouth Daily., Disp: 30 tablet, Rfl: 11  •  carvedilol (COREG) 25 MG tablet, TAKE ONE TABLET BY MOUTH TWICE A DAY, Disp: 180 tablet, Rfl: 3  •  furosemide (LASIX) 20 MG tablet, Take 20 mg  "by mouth As Needed., Disp: , Rfl:   •  lisinopril (PRINIVIL,ZESTRIL) 20 MG tablet, Take 1 tablet by mouth Daily., Disp: 30 tablet, Rfl: 6  •  lisinopril-hydrochlorothiazide (PRINZIDE,ZESTORETIC) 20-25 MG per tablet, Take 1 tablet by mouth As Needed., Disp: , Rfl:   •  omeprazole (PriLOSEC) 20 MG capsule, Take 20 mg by mouth daily., Disp: , Rfl:     The following portions of the patient's history were reviewed and updated as appropriate: allergies, current medications, past family history, past medical history, past social history, past surgical history and problem list.    ROS  Review of Systems   Constitution: Negative for chills, fever, fatigue, generalized weakness.   Cardiovascular: Negative for chest pain, dyspnea on exertion, leg swelling, orthopnea, and syncope. Positive for palpitations.   Respiratory: Negative for cough, shortness of breath, and wheezing.  HENT: Negative for ear pain, nosebleeds, and tinnitus.  Gastrointestinal: Negative for abdominal pain, constipation, diarrhea, nausea and vomiting.   Genitourinary: No urinary symptoms.  Musculoskeletal: Negative for muscle cramps.  Neurological: Negative for dizziness, headaches, loss of balance, numbness, and symptoms of stroke.  Psychiatric: Normal mental status.     All other systems reviewed and are negative.        Objective:     /74 (BP Location: Left arm, Patient Position: Sitting)   Pulse 72   Ht 180.3 cm (71\")   Wt 93 kg (205 lb)   SpO2 97%   BMI 28.59 kg/m²      Physical Exam  Constitutional: Patient appears well-developed and well-nourished.   HENT: HEENT exam unremarkable.   Neck: Neck supple. No JVD present. No carotid bruits.   Cardiovascular: Normal rate, regular rhythm and normal heart sounds. No murmur heard.   2+ symmetric pulses.   Pulmonary/Chest: Breath sounds normal. Does not exhibit tenderness.   Abdominal: Abdomen benign.   Musculoskeletal: Does not exhibit edema.   Neurological: Neurological exam unremarkable.   Vitals " reviewed.    Data Review:   Lab date: 01/22/2020  · FLP: TC 94, TG 58, HDL 32, LDL 51  · CMP: Glu 108, BUN 21, Creat 0.93, eGFR >60, Na 142, K 4.7, Cl 106, CO2 29.2, Ca 9, Alk Phos 149, AST 23, ALT 26      Procedures       Assessment:      Diagnosis Plan   1. Coronary artery disease involving native coronary artery of native heart without angina pectoris  Stable with no current angina; continue aspirin 325 mg daily for antiplatelet therapy.   2. Palpitations, resolved 48 hr Holter monitor placed on patient in office today.    3. Essential hypertension  Well-controlled; continue current medications.    4. Mixed hyperlipidemia  Well-controlled; continue atorvastatin 40 mg daily.      Plan:   48 hr Holter monitor placed on patient in office today for further assessment of his palpitations..   Continue current medications including Lasix 20 mg daily as needed for edema or fluid retention with associated shortness of breath.   FU in 6 MO, sooner as needed.  Advanced care planning to be discussed with ROSA Mclain.   Thank you for allowing us to participate in the care of your patient.     Scribed for Michelle Frank MD by Marizol Tripathi. 3/23/2021  10:17 EDT      I, Michelle Frank MD, personally performed the services described in this documentation as scribed by the above named individual in my presence, and it is both accurate and complete.  3/24/2021  07:22 EDT        Please note that portions of this note may have been completed with a voice recognition program. Efforts were made to edit the dictations, but occasionally words are mistranscribed.

## 2021-04-09 RX ORDER — LISINOPRIL 20 MG/1
20 TABLET ORAL DAILY
Qty: 30 TABLET | Refills: 6 | Status: SHIPPED | OUTPATIENT
Start: 2021-04-09 | End: 2022-09-27 | Stop reason: SDUPTHER

## 2021-10-18 NOTE — PROGRESS NOTES
[facility] Cardiology    Encounter Date: 10/19/2021    Patient ID: Zachary Melendez is a 81 y.o. male.  : 1940     PCP: Nerissa Cortez MD       Chief Complaint: Coronary artery disease involving native coronary artery of       PROBLEM LIST:  1. Coronary artery disease:  a. NSTEMI, 2016.  b. LHC, 2016: 80-90% LAD treated with 2.5 x 18 FERNANDO, 50% Cx with normal IFR, 30% RCA. EF 45%.  c. Echo, 2017: EF 61-65%. Anterior leaflet thickening present. No significant VHD.  2. Cardiomyopathy:  a. EF 45% at the time of NSTEMI in , with subsequent normalization.  b. Echo, 2017: EF 61-65%.  3. Palpitations  a. 48-hour holter monitor, 3/30/2021: Frequent PVCs including bigeminy. Occasional PACs. Occasional PAT/SVT up to 15 beats. No symptoms.   4. Hypertension.  5. Dyslipidemia.  6. GERD.  7. Obesity.  8. Nephrolithiasis.  9. Surgical Hx:  a. TURP.  b. Bilateral inguinal repair.  c. Cholecystectomy.    History of Present Illness  Patient presents today for a 6 month follow-up with a history of coronary artery disease, palpitations, and cardiac risk factors. Since last visit, he has done very well. He has no complaints of chest pain or exertional dyspnea, no weakness or heart failure type symptoms. 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 He has no hindrance with activity. He gets routine labs with PCP. He needs refills on Coreg today.    No Known Allergies      Current Outpatient Medications:   •  ALLOPURINOL PO, Take 100 mg by mouth daily., Disp: , Rfl:   •  amLODIPine (NORVASC) 10 MG tablet, Take 1 tablet by mouth Daily., Disp: 90 tablet, Rfl: 3  •  aspirin 325 MG tablet, Take 325 mg by mouth daily., Disp: , Rfl:   •  atorvastatin (LIPITOR) 40 MG tablet, Take 1 tablet by mouth Daily., Disp: 30 tablet, Rfl: 11  •  carvedilol (COREG) 25 MG tablet, TAKE ONE TABLET BY MOUTH TWICE A DAY, Disp: 180 tablet, Rfl: 3  •  furosemide (LASIX) 20 MG  "tablet, Take 20 mg by mouth As Needed., Disp: , Rfl:   •  lisinopril (PRINIVIL,ZESTRIL) 20 MG tablet, Take 1 tablet by mouth Daily., Disp: 30 tablet, Rfl: 6  •  lisinopril-hydrochlorothiazide (PRINZIDE,ZESTORETIC) 20-25 MG per tablet, Take 1 tablet by mouth Every Evening., Disp: , Rfl:   •  omeprazole (PriLOSEC) 20 MG capsule, Take 20 mg by mouth daily., Disp: , Rfl:     The following portions of the patient's history were reviewed and updated as appropriate: allergies, current medications, past family history, past medical history, past social history, past surgical history and problem list.    ROS  Review of Systems   Constitution: Negative for chills, fever, fatigue, generalized weakness.   Cardiovascular: Negative for chest pain, dyspnea on exertion, leg swelling, palpitations, orthopnea, and syncope.   Respiratory: Negative for cough, shortness of breath, and wheezing.  HENT: Negative for ear pain, nosebleeds, and tinnitus.  Gastrointestinal: Negative for abdominal pain, constipation, diarrhea, nausea and vomiting.   Genitourinary: No urinary symptoms.  Musculoskeletal: Negative for muscle cramps.  Neurological: Negative for dizziness, headaches, loss of balance, numbness, and symptoms of stroke.  Psychiatric: Normal mental status.     All other systems reviewed and are negative.        Objective:     /82 (BP Location: Right arm, Patient Position: Sitting)   Pulse 86   Ht 180.3 cm (71\")   Wt 92.1 kg (203 lb)   SpO2 96%   BMI 28.31 kg/m²      Physical Exam  Constitutional: Patient appears well-developed and well-nourished.   HENT: HEENT exam unremarkable.   Neck: Neck supple. No JVD present. No carotid bruits.   Cardiovascular: Normal rate, regular rhythm and normal heart sounds. No murmur heard.   2+ symmetric pulses.   Pulmonary/Chest: Breath sounds normal. Does not exhibit tenderness.   Abdominal: Abdomen benign.   Musculoskeletal: Does not exhibit edema.   Neurological: Neurological exam " unremarkable.   Vitals reviewed.    Data Review:     Lab date: 01/22/2020  · FLP: TC 94, TG 58, HDL 32, LDL 51  · CMP: Glu 108, BUN 21, Creat 0.93, eGFR >60, Na 142, K 4.7, Cl 106, CO2 29.2, Ca 9, Alk Phos 149, AST 23, ALT 26       Procedures       Assessment:      Diagnosis Plan   1. Coronary artery disease involving native coronary artery of native heart without angina pectoris     2. Palpitations     3. Essential hypertension     4. Mixed hyperlipidemia       Plan:   Patient is doing well and asymptomatic. He remains active without symptoms of exertional angina or dyspnea. He is tolerating medications without side effects.  Continue current medications. Refill carvedilol  FU in 8 MO, sooner as needed.  Thank you for allowing us to participate in the care of your patient.     Clover Carr PA-C working independently.

## 2021-10-19 ENCOUNTER — OFFICE VISIT (OUTPATIENT)
Dept: CARDIOLOGY | Facility: CLINIC | Age: 81
End: 2021-10-19

## 2021-10-19 VITALS
DIASTOLIC BLOOD PRESSURE: 82 MMHG | OXYGEN SATURATION: 96 % | HEIGHT: 71 IN | SYSTOLIC BLOOD PRESSURE: 142 MMHG | BODY MASS INDEX: 28.42 KG/M2 | HEART RATE: 86 BPM | WEIGHT: 203 LBS

## 2021-10-19 DIAGNOSIS — I10 ESSENTIAL HYPERTENSION: ICD-10-CM

## 2021-10-19 DIAGNOSIS — R00.2 PALPITATIONS: ICD-10-CM

## 2021-10-19 DIAGNOSIS — I25.10 CORONARY ARTERY DISEASE INVOLVING NATIVE CORONARY ARTERY OF NATIVE HEART WITHOUT ANGINA PECTORIS: Primary | ICD-10-CM

## 2021-10-19 DIAGNOSIS — E78.2 MIXED HYPERLIPIDEMIA: ICD-10-CM

## 2021-10-19 PROCEDURE — 99214 OFFICE O/P EST MOD 30 MIN: CPT | Performed by: INTERNAL MEDICINE

## 2021-10-19 RX ORDER — CARVEDILOL 25 MG/1
25 TABLET ORAL 2 TIMES DAILY
Qty: 180 TABLET | Refills: 3 | Status: SHIPPED | OUTPATIENT
Start: 2021-10-19

## 2022-03-21 ENCOUNTER — OFFICE VISIT (OUTPATIENT)
Dept: UROLOGY | Facility: CLINIC | Age: 82
End: 2022-03-21

## 2022-03-21 VITALS — HEIGHT: 71 IN | WEIGHT: 195 LBS | BODY MASS INDEX: 27.3 KG/M2

## 2022-03-21 DIAGNOSIS — R97.20 ELEVATED PROSTATE SPECIFIC ANTIGEN (PSA): ICD-10-CM

## 2022-03-21 DIAGNOSIS — N42.9 DISORDER OF PROSTATE: Primary | ICD-10-CM

## 2022-03-21 PROCEDURE — 36415 COLL VENOUS BLD VENIPUNCTURE: CPT | Performed by: UROLOGY

## 2022-03-21 PROCEDURE — 99203 OFFICE O/P NEW LOW 30 MIN: CPT | Performed by: UROLOGY

## 2022-03-21 PROCEDURE — 84153 ASSAY OF PSA TOTAL: CPT | Performed by: UROLOGY

## 2022-03-21 NOTE — PROGRESS NOTES
Chief Complaint:          Chief Complaint   Patient presents with   • Elevated PSA     New PAtient        HPI:   81 y.o. male.  Referred for evaluation of an elevated PSA and remote history of kidney stones.  His PSA was 5.10.  Other labs are unremarkable.  He has no he reports no lower urinary tract symptomatology, particularly irritative symptoms such as frequency, urgency, dysuria, and obstructive symptomatology, particularly dribbling, hesitancy, and intermittency.  Deviously had PSA [facility].  He had 1 stone.  He gets up twice at night.  He has had a heart attack he is very hard of hearing.  He is previously seen Dr. Rg,and Bux exams unremarkable showing a large prostate given his age I recommend only observation    Past Medical History:        Past Medical History:   Diagnosis Date   • Chronic kidney disease    • Coronary artery disease    • Hyperlipidemia    • Hypertension          Current Meds:     Current Outpatient Medications   Medication Sig Dispense Refill   • ALLOPURINOL PO Take 100 mg by mouth daily.     • amLODIPine (NORVASC) 10 MG tablet Take 1 tablet by mouth Daily. 90 tablet 3   • aspirin 325 MG tablet Take 325 mg by mouth daily.     • atorvastatin (LIPITOR) 40 MG tablet Take 1 tablet by mouth Daily. 30 tablet 11   • carvedilol (COREG) 25 MG tablet Take 1 tablet by mouth 2 (Two) Times a Day. 180 tablet 3   • furosemide (LASIX) 20 MG tablet Take 20 mg by mouth As Needed.     • lisinopril (PRINIVIL,ZESTRIL) 20 MG tablet Take 1 tablet by mouth Daily. 30 tablet 6   • lisinopril-hydrochlorothiazide (PRINZIDE,ZESTORETIC) 20-25 MG per tablet Take 1 tablet by mouth Every Evening.     • omeprazole (PriLOSEC) 20 MG capsule Take 20 mg by mouth daily.       No current facility-administered medications for this visit.        Allergies:      No Known Allergies     Past Surgical History:     Past Surgical History:   Procedure Laterality Date   • CORONARY ANGIOPLASTY WITH STENT PLACEMENT Left 05/02/2016          Social History:     Social History     Socioeconomic History   • Marital status:    Tobacco Use   • Smoking status: Never Smoker   • Smokeless tobacco: Never Used   Vaping Use   • Vaping Use: Never used   Substance and Sexual Activity   • Alcohol use: No   • Drug use: No   • Sexual activity: Defer       Family History:     Family History   Problem Relation Age of Onset   • No Known Problems Mother    • No Known Problems Father        Review of Systems:     Review of Systems   Constitutional: Negative.    HENT: Negative.    Eyes: Negative.    Respiratory: Negative.    Cardiovascular: Negative.    Gastrointestinal: Negative.    Endocrine: Negative.    Musculoskeletal: Negative.    Allergic/Immunologic: Negative.    Neurological: Negative.    Hematological: Negative.    Psychiatric/Behavioral: Negative.        Physical Exam:     Physical Exam  Vitals and nursing note reviewed.   Constitutional:       Appearance: He is well-developed.   HENT:      Head: Normocephalic and atraumatic.   Eyes:      Conjunctiva/sclera: Conjunctivae normal.      Pupils: Pupils are equal, round, and reactive to light.   Cardiovascular:      Rate and Rhythm: Normal rate and regular rhythm.      Heart sounds: Normal heart sounds.   Pulmonary:      Effort: Pulmonary effort is normal.      Breath sounds: Normal breath sounds.   Abdominal:      General: Bowel sounds are normal.      Palpations: Abdomen is soft.   Genitourinary:     Prostate: Normal.      Comments: Greater than 60 g prostate to palpation  Musculoskeletal:         General: Normal range of motion.      Cervical back: Normal range of motion.   Skin:     General: Skin is warm and dry.   Neurological:      Mental Status: He is alert and oriented to person, place, and time.      Deep Tendon Reflexes: Reflexes are normal and symmetric.   Psychiatric:         Behavior: Behavior normal.         Thought Content: Thought content normal.         Judgment: Judgment normal.          I have reviewed the following portions of the patient's history: Allergies, current medications, past family history, past medical history, past social history, past surgical history, problem list, and ROS and confirm it is accurate.      Procedure:       Assessment/Plan:   Elevated prostate specific antigen-we discussed the diagnosis of elevated prostate-specific antigen.  I explained the pathophysiology of PSA.  It is a serine protease that's function in the male reproductive tract is to facilitate the liquefaction of semen.  It is for this reason the body does not want it freely floating in the serum and why typically bound tightly to albumin.  We discussed why we used both a PSA free and total to determine the need for more aggressive therapy. I discussed the normal range.  Additionally, it was in the range of 1 to 4, but more recently has been downgraded to something less than 2 or even approaching 1.  I discussed the risk of family history, particularly the fact that the average male has a 14% risk of prostate cancer and that in the face of a positive diagnosis in a father it will tablet and any other first-generation relative continued tablet insofar that a father and brother with prostate cancer will produce almost a 50% risk of prostate cancer.  I discussed the use of the temporal use of PSA as the best option for monitoring.  We also discussed the fact that an elevated PSA is an isolated event does not mean that this is prostate cancer and should not engender worry in this regard. I discussed other things that can elevate PSA including constipation, prostatitis, infection, recent intercourse etc., as well as the risks and benefits associated with this.  Also discussed the fact that this is with a dilutional test and as a consequence of such were present produce slight variations on a single specimen.  Further discussed the risks and benefits of a prostate biopsy as well.  Given the PSA elevated to  5.1 and his age as well as the size of the prostate this is physiologic                  This document has been electronically signed by IVETH MINOR MD March 21, 2022 14:54 EDT

## 2022-03-22 LAB — PSA SERPL-MCNC: 4.09 NG/ML (ref 0–4)

## 2022-03-27 PROBLEM — R97.20 ELEVATED PROSTATE SPECIFIC ANTIGEN (PSA): Status: ACTIVE | Noted: 2022-03-27

## 2022-06-28 ENCOUNTER — OFFICE VISIT (OUTPATIENT)
Dept: CARDIOLOGY | Facility: CLINIC | Age: 82
End: 2022-06-28

## 2022-06-28 VITALS
BODY MASS INDEX: 28.63 KG/M2 | HEART RATE: 71 BPM | HEIGHT: 70 IN | OXYGEN SATURATION: 98 % | SYSTOLIC BLOOD PRESSURE: 132 MMHG | WEIGHT: 200 LBS | DIASTOLIC BLOOD PRESSURE: 78 MMHG

## 2022-06-28 DIAGNOSIS — E78.2 MIXED HYPERLIPIDEMIA: ICD-10-CM

## 2022-06-28 DIAGNOSIS — I10 ESSENTIAL HYPERTENSION: ICD-10-CM

## 2022-06-28 DIAGNOSIS — I25.10 CORONARY ARTERY DISEASE INVOLVING NATIVE CORONARY ARTERY OF NATIVE HEART WITHOUT ANGINA PECTORIS: Primary | ICD-10-CM

## 2022-06-28 DIAGNOSIS — R00.2 PALPITATIONS: ICD-10-CM

## 2022-06-28 PROCEDURE — 93000 ELECTROCARDIOGRAM COMPLETE: CPT | Performed by: INTERNAL MEDICINE

## 2022-06-28 PROCEDURE — 99214 OFFICE O/P EST MOD 30 MIN: CPT | Performed by: INTERNAL MEDICINE

## 2022-06-28 RX ORDER — ASPIRIN 81 MG/1
81 TABLET ORAL DAILY
COMMUNITY

## 2022-06-28 NOTE — PROGRESS NOTES
[facility] Cardiology    Encounter Date: 2022    Patient ID: Zachary Melendez is a 81 y.o. male.  : 1940     PCP: Nerissa Cortez MD       Chief Complaint: Coronary Artery Disease      PROBLEM LIST:  1. Coronary artery disease:  a. NSTEMI, 2016.  b. LHC, 2016: 80-90% LAD treated with 2.5 x 18 FERNANDO, 50% Cx with normal IFR, 30% RCA. EF 45%.  c. Echo, 2017: EF 61-65%. Anterior leaflet thickening present. No significant VHD.  2. Cardiomyopathy:  a. EF 45% at the time of NSTEMI in , with subsequent normalization.  b. Echo, 2017: EF 61-65%.  3. Palpitations  d. 48-hour holter monitor, 3/30/2021: Frequent PVCs including bigeminy. Occasional PACs. Occasional PAT/SVT up to 15 beats. No symptoms.   4. Hypertension.  5. Dyslipidemia.  6. GERD.  7. Obesity.  8. Nephrolithiasis.  9. Surgical Hx:  a. TURP.  b. Bilateral inguinal repair.  c. Cholecystectomy.    History of Present Illness  Patient presents today for a 8 month follow-up with a history of coronary artery disease, palpitations, and cardiac risk factors. Since last visit, patient complains that he has been experiencing an occasional irregular heart beat. He states that this does not happen every day but maybe every two to three days. This has caused some dizziness. He does take lisinopril-HCTZ in the morning and lisinopril in the evening. Patient denies chest pain, shortness of breath, orthopnea, edema, dizziness, and syncope.     No Known Allergies      Current Outpatient Medications:   •  ALLOPURINOL PO, Take 100 mg by mouth daily., Disp: , Rfl:   •  amLODIPine (NORVASC) 10 MG tablet, Take 1 tablet by mouth Daily., Disp: 90 tablet, Rfl: 3  •  atorvastatin (LIPITOR) 40 MG tablet, Take 1 tablet by mouth Daily., Disp: 30 tablet, Rfl: 11  •  carvedilol (COREG) 25 MG tablet, Take 1 tablet by mouth 2 (Two) Times a Day., Disp: 180 tablet, Rfl: 3  •  lisinopril (PRINIVIL,ZESTRIL) 20 MG tablet, Take 1  "tablet by mouth Daily., Disp: 30 tablet, Rfl: 6  •  lisinopril-hydrochlorothiazide (PRINZIDE,ZESTORETIC) 20-25 MG per tablet, Take 1 tablet by mouth Every Evening., Disp: , Rfl:   •  omeprazole (PriLOSEC) 20 MG capsule, Take 20 mg by mouth daily., Disp: , Rfl:   •  aspirin 81 MG EC tablet, Take 81 mg by mouth Daily., Disp: , Rfl:     The following portions of the patient's history were reviewed and updated as appropriate: allergies, current medications, past family history, past medical history, past social history, past surgical history and problem list.    ROS  Review of Systems   Constitution: Negative for chills, fever, fatigue, generalized weakness.   Cardiovascular: Negative for chest pain, dyspnea on exertion, leg swelling, orthopnea, and syncope. Positive for palpitations.   Respiratory: Negative for cough, shortness of breath, and wheezing.  HENT: Negative for ear pain, nosebleeds, and tinnitus.  Gastrointestinal: Negative for abdominal pain, constipation, diarrhea, nausea and vomiting.   Genitourinary: No urinary symptoms.  Musculoskeletal: Negative for muscle cramps.  Neurological: Negative for dizziness, headaches, loss of balance, numbness, and symptoms of stroke.  Psychiatric: Normal mental status.     All other systems reviewed and are negative.        Objective:     /78 (BP Location: Right arm, Patient Position: Sitting)   Pulse 71   Ht 177.8 cm (70\")   Wt 90.7 kg (200 lb)   SpO2 98%   BMI 28.70 kg/m²      Physical Exam  Constitutional: Patient appears well-developed and well-nourished.   HENT: HEENT exam unremarkable.   Neck: Neck supple. No JVD present. No carotid bruits.   Cardiovascular: Normal rate, regular rhythm and normal heart sounds. No murmur heard.   2+ symmetric pulses.   Pulmonary/Chest: Breath sounds normal. Does not exhibit tenderness.   Abdominal: Abdomen benign.   Musculoskeletal: Does not exhibit edema.   Neurological: Neurological exam unremarkable.   Vitals " reviewed.    Data Review:     Lab date: 02/14/2022  • FLP: .5, TG 58.6, HDL 46.4, LDL 59  • CMP: Glu 112, BUN 13, Creat 0.98, eGFR >60, Na 139, K 4.9, Cl 101, CO2 28.4, Ca 9.6, Alk Phos 157, AST 20.7, ALT 17.1     Lab Results   Component Value Date    CHLPL 153 05/02/2016    TRIG 74 05/02/2016    HDL 32 (L) 05/02/2016     05/02/2016           ECG 12 Lead    Date/Time: 6/28/2022 9:51 AM  Performed by: Michelle Frank MD  Authorized by: Michelle Frank MD   Comparison: compared with previous ECG from 5/1/2016  Similar to previous ECG  Rhythm: sinus rhythm  BPM: 63    Clinical impression: normal ECG                     Assessment:      Diagnosis Plan   1. Coronary artery disease involving native coronary artery of native heart without angina pectoris  Stable without angina on current activity. Patient was advised he can decrease his aspirin from 325 mg to 81 mg.    2. Palpitations  14 day heart monitor placed on patient to further evaluate palpitations and dizziness. Continue on carvedilol 25 mg.    3. Essential hypertension  Well controlled. Continue on amlodipine 10 mg, lisinopril-HCTZ 20-25 mg, and lisinopril 20 mg.    4. Mixed hyperlipidemia  Well controlled. Continue on atorvastatin 40 mg.      Plan:   14 day heart monitor placed on patient to further evaluate palpitations and dizziness.   Patient was advised he can decrease his aspirin from 325 mg to 81 mg.   Strongly encouraged patient to maintain hydration by drinking 6-8 glasses of water a day and decrease soda/caffeine consumption.   Continue current medications.   FU in 3 MO, sooner as needed.  Thank you for allowing us to participate in the care of your patient.       Scribed for Michelle Frank MD by Chana Fisher. 6/28/2022 09:45 EDT         I, Michelle Frank MD, personally performed the services described in this documentation as scribed by the above named individual in my presence, and it is both accurate and complete.  6/28/2022  12:42  EDT        Please note that portions of this note may have been completed with a voice recognition program. Efforts were made to edit the dictations, but occasionally words are mistranscribed.

## 2022-09-20 ENCOUNTER — OFFICE VISIT (OUTPATIENT)
Dept: UROLOGY | Facility: CLINIC | Age: 82
End: 2022-09-20

## 2022-09-20 VITALS — HEIGHT: 70 IN | WEIGHT: 200 LBS | BODY MASS INDEX: 28.63 KG/M2

## 2022-09-20 DIAGNOSIS — N42.9 DISORDER OF PROSTATE: Primary | ICD-10-CM

## 2022-09-20 DIAGNOSIS — R97.20 ELEVATED PROSTATE SPECIFIC ANTIGEN (PSA): ICD-10-CM

## 2022-09-20 PROCEDURE — 99213 OFFICE O/P EST LOW 20 MIN: CPT | Performed by: UROLOGY

## 2022-09-20 PROCEDURE — 36415 COLL VENOUS BLD VENIPUNCTURE: CPT | Performed by: UROLOGY

## 2022-09-20 PROCEDURE — 84153 ASSAY OF PSA TOTAL: CPT | Performed by: UROLOGY

## 2022-09-20 NOTE — PROGRESS NOTES
Chief Complaint:      Chief Complaint   Patient presents with   • Elevated PSA       HPI:   81 y.o. male treatment large prostate was 5.1 back in January 2021.  He reports no lower urinary tract symptomatology negative family history.  PSA is pending I will see him back in a year pending the results of his labs    Past Medical History:     Past Medical History:   Diagnosis Date   • Chronic kidney disease    • Coronary artery disease    • Hyperlipidemia    • Hypertension        Current Meds:     Current Outpatient Medications   Medication Sig Dispense Refill   • ALLOPURINOL PO Take 100 mg by mouth daily.     • amLODIPine (NORVASC) 10 MG tablet Take 1 tablet by mouth Daily. 90 tablet 3   • aspirin 81 MG EC tablet Take 81 mg by mouth Daily.     • atorvastatin (LIPITOR) 40 MG tablet Take 1 tablet by mouth Daily. 30 tablet 11   • carvedilol (COREG) 25 MG tablet Take 1 tablet by mouth 2 (Two) Times a Day. 180 tablet 3   • lisinopril (PRINIVIL,ZESTRIL) 20 MG tablet Take 1 tablet by mouth Daily. 30 tablet 6   • lisinopril-hydrochlorothiazide (PRINZIDE,ZESTORETIC) 20-25 MG per tablet Take 1 tablet by mouth Every Evening.     • omeprazole (PriLOSEC) 20 MG capsule Take 20 mg by mouth daily.       No current facility-administered medications for this visit.        Allergies:      No Known Allergies     Past Surgical History:     Past Surgical History:   Procedure Laterality Date   • CORONARY ANGIOPLASTY WITH STENT PLACEMENT Left 05/02/2016       Social History:     Social History     Socioeconomic History   • Marital status:    Tobacco Use   • Smoking status: Never Smoker   • Smokeless tobacco: Never Used   Vaping Use   • Vaping Use: Never used   Substance and Sexual Activity   • Alcohol use: No   • Drug use: No   • Sexual activity: Defer       Family History:     Family History   Problem Relation Age of Onset   • No Known Problems Mother    • No Known Problems Father        Review of Systems:     Review of Systems    Constitutional: Negative.    HENT: Negative.    Eyes: Negative.    Respiratory: Negative.    Cardiovascular: Negative.    Gastrointestinal: Negative.    Endocrine: Negative.    Musculoskeletal: Negative.    Allergic/Immunologic: Negative.    Neurological: Negative.    Hematological: Negative.    Psychiatric/Behavioral: Negative.        Physical Exam:     Physical Exam  Vitals and nursing note reviewed.   Constitutional:       Appearance: He is well-developed.   HENT:      Head: Normocephalic and atraumatic.   Eyes:      Conjunctiva/sclera: Conjunctivae normal.      Pupils: Pupils are equal, round, and reactive to light.   Cardiovascular:      Rate and Rhythm: Normal rate and regular rhythm.      Heart sounds: Normal heart sounds.   Pulmonary:      Effort: Pulmonary effort is normal.      Breath sounds: Normal breath sounds.   Abdominal:      General: Bowel sounds are normal.      Palpations: Abdomen is soft.   Musculoskeletal:         General: Normal range of motion.      Cervical back: Normal range of motion.   Skin:     General: Skin is warm and dry.   Neurological:      Mental Status: He is alert and oriented to person, place, and time.      Deep Tendon Reflexes: Reflexes are normal and symmetric.   Psychiatric:         Behavior: Behavior normal.         Thought Content: Thought content normal.         Judgment: Judgment normal.         I have reviewed the following portions of the patient's history: Allergies, current medications, past family history, past medical history, past social history, past surgical history, problem list, and ROS and confirm it is accurate.    Recent Image (CT and/or KUB):      CT Abdomen and Pelvis: No results found for this or any previous visit.       CT Stone Protocol: No results found for this or any previous visit.       KUB: No results found for this or any previous visit.       Labs (past 3 months):      No visits with results within 3 Month(s) from this visit.   Latest known  visit with results is:   Office Visit on 03/21/2022   Component Date Value Ref Range Status   • PSA 03/21/2022 4.090 (A) 0.000 - 4.000 ng/mL Final        Procedure:       Assessment/Plan:   Elevated prostate specific antigen-we discussed the diagnosis of elevated prostate-specific antigen.  I explained the pathophysiology of PSA.  It is a serine protease that's function in the male reproductive tract is to facilitate the liquefaction of semen.  It is for this reason the body does not want it freely floating in the serum and why typically bound tightly to albumin.  We discussed why we used both a PSA free and total to determine the need for more aggressive therapy. I discussed the normal range.  Additionally, it was in the range of 1 to 4, but more recently has been downgraded to something less than 2 or even approaching 1.  I discussed the risk of family history, particularly the fact that the average male has a 14% risk of prostate cancer and that in the face of a positive diagnosis in a father it will tablet and any other first-generation relative continued tablet insofar that a father and brother with prostate cancer will produce almost a 50% risk of prostate cancer.  I discussed the use of the temporal use of PSA as the best option for monitoring.  We also discussed the fact that an elevated PSA is an isolated event does not mean that this is prostate cancer and should not engender worry in this regard. I discussed other things that can elevate PSA including constipation, prostatitis, infection, recent intercourse etc., as well as the risks and benefits associated with this.  Also discussed the fact that this is with a dilutional test and as a consequence of such were present produce slight variations on a single specimen.  Further discussed the risks and benefits of a prostate biopsy as well.  Check PSA            This document has been electronically signed by IVETH MINOR MD September 20, 2022 13:27  EDT    Dictated Utilizing Dragon Dictation: Part of this note may be an electronic transcription/translation of spoken language to printed text using the Dragon Dictation System.

## 2022-09-21 LAB — PSA SERPL-MCNC: 3.6 NG/ML (ref 0–4)

## 2022-09-27 ENCOUNTER — OFFICE VISIT (OUTPATIENT)
Dept: CARDIOLOGY | Facility: CLINIC | Age: 82
End: 2022-09-27

## 2022-09-27 VITALS
HEIGHT: 70 IN | HEART RATE: 66 BPM | SYSTOLIC BLOOD PRESSURE: 124 MMHG | BODY MASS INDEX: 28.63 KG/M2 | WEIGHT: 200 LBS | OXYGEN SATURATION: 96 % | DIASTOLIC BLOOD PRESSURE: 80 MMHG

## 2022-09-27 DIAGNOSIS — I10 ESSENTIAL HYPERTENSION: ICD-10-CM

## 2022-09-27 DIAGNOSIS — I25.10 CORONARY ARTERY DISEASE INVOLVING NATIVE CORONARY ARTERY OF NATIVE HEART WITHOUT ANGINA PECTORIS: Primary | ICD-10-CM

## 2022-09-27 DIAGNOSIS — E78.2 MIXED HYPERLIPIDEMIA: ICD-10-CM

## 2022-09-27 DIAGNOSIS — R00.2 PALPITATIONS: ICD-10-CM

## 2022-09-27 PROCEDURE — 99214 OFFICE O/P EST MOD 30 MIN: CPT | Performed by: INTERNAL MEDICINE

## 2022-09-27 RX ORDER — LISINOPRIL 20 MG/1
20 TABLET ORAL 2 TIMES DAILY
Qty: 180 TABLET | Refills: 3 | Status: SHIPPED | OUTPATIENT
Start: 2022-09-27

## 2023-04-11 ENCOUNTER — OFFICE VISIT (OUTPATIENT)
Dept: CARDIOLOGY | Facility: CLINIC | Age: 83
End: 2023-04-11
Payer: MEDICARE

## 2023-04-11 VITALS
DIASTOLIC BLOOD PRESSURE: 70 MMHG | HEIGHT: 70 IN | HEART RATE: 71 BPM | OXYGEN SATURATION: 98 % | SYSTOLIC BLOOD PRESSURE: 160 MMHG | WEIGHT: 201.6 LBS | BODY MASS INDEX: 28.86 KG/M2

## 2023-04-11 DIAGNOSIS — R00.2 PALPITATIONS: ICD-10-CM

## 2023-04-11 DIAGNOSIS — E78.2 MIXED HYPERLIPIDEMIA: ICD-10-CM

## 2023-04-11 DIAGNOSIS — I25.10 CORONARY ARTERY DISEASE INVOLVING NATIVE CORONARY ARTERY OF NATIVE HEART WITHOUT ANGINA PECTORIS: Primary | ICD-10-CM

## 2023-04-11 DIAGNOSIS — I10 ESSENTIAL HYPERTENSION: ICD-10-CM

## 2023-04-11 PROCEDURE — 1160F RVW MEDS BY RX/DR IN RCRD: CPT | Performed by: INTERNAL MEDICINE

## 2023-04-11 PROCEDURE — 3078F DIAST BP <80 MM HG: CPT | Performed by: INTERNAL MEDICINE

## 2023-04-11 PROCEDURE — 3077F SYST BP >= 140 MM HG: CPT | Performed by: INTERNAL MEDICINE

## 2023-04-11 PROCEDURE — 1159F MED LIST DOCD IN RCRD: CPT | Performed by: INTERNAL MEDICINE

## 2023-04-11 PROCEDURE — 99214 OFFICE O/P EST MOD 30 MIN: CPT | Performed by: INTERNAL MEDICINE

## 2023-04-11 RX ORDER — CARVEDILOL 25 MG/1
25 TABLET ORAL 2 TIMES DAILY
Qty: 180 TABLET | Refills: 3 | Status: SHIPPED | OUTPATIENT
Start: 2023-04-11

## 2023-04-11 RX ORDER — ATORVASTATIN CALCIUM 40 MG/1
40 TABLET, FILM COATED ORAL DAILY
Qty: 90 TABLET | Refills: 3 | Status: SHIPPED | OUTPATIENT
Start: 2023-04-11

## 2023-04-11 NOTE — PROGRESS NOTES
[facility] Cardiology    Encounter Date: 2023    Patient ID: Zachary Melendez is a 82 y.o. male.  : 1940     PCP: Nerissa Cortez MD       Chief Complaint: Coronary artery disease involving native coronary artery of      PROBLEM LIST:  1. Coronary artery disease:  a. NSTEMI, 2016.  b. LHC, 2016: 80-90% LAD treated with 2.5 x 18 FERNANDO, 50% Cx with normal IFR, 30% RCA. EF 45%.  c. Echo, 2017: EF 61-65%. Anterior leaflet thickening present. No significant VHD.  2. Cardiomyopathy:  a. EF 45% at the time of NSTEMI in , with subsequent normalization.  b. Echo, 2017: EF 61-65%.  3. Palpitations  A. 48-hour holter monitor, 3/30/2021: Frequent PVCs including bigeminy. Occasional PACs. Occasional PAT/SVT up to 15 beats. No symptoms.   B. 14-day event monitor, 2022: Sinus rhythm. Occasional APCs, frequent PVCs. Occasional SVT up to 26 beats. No symptoms.  4. Hypertension.  5. Dyslipidemia.  6. GERD.  7. Obesity.  8. Nephrolithiasis.  9. Surgical Hx:  a. TURP.  b. Bilateral inguinal repair.  c. Cholecystectomy.    History of Present Illness  Patient presents today for 6-month follow-up with a history of coronary artery disease, cardiomyopathy, palpitations, and cardiac risk factors. Since last visit, patient has been doing well from a cardiovascular standpoint. He states that he monitor his blood pressure and at home it is usually within the normal range of around 130 and rarely above 140 systolic.He maintains an active lifestyle. Patient denies chest pain, shortness of breath, palpitations, edema, dizziness, and syncope.      No Known Allergies      Current Outpatient Medications:   •  ALLOPURINOL PO, Take 100 mg by mouth daily., Disp: , Rfl:   •  amLODIPine (NORVASC) 10 MG tablet, Take 1 tablet by mouth Daily., Disp: 90 tablet, Rfl: 3  •  aspirin 81 MG EC tablet, Take 1 tablet by mouth Daily., Disp: , Rfl:   •  atorvastatin (Lipitor) 40 MG tablet,  "Take 1 tablet by mouth Daily., Disp: 90 tablet, Rfl: 3  •  carvedilol (COREG) 25 MG tablet, Take 1 tablet by mouth 2 (Two) Times a Day., Disp: 180 tablet, Rfl: 3  •  lisinopril (PRINIVIL,ZESTRIL) 20 MG tablet, Take 1 tablet by mouth 2 (Two) Times a Day., Disp: 180 tablet, Rfl: 3  •  omeprazole (PriLOSEC) 20 MG capsule, Take 1 capsule by mouth Daily., Disp: , Rfl:     The following portions of the patient's history were reviewed and updated as appropriate: allergies, current medications, past family history, past medical history, past social history, past surgical history and problem list.    ROS  Review of Systems   14 point ROS negative except for that listed in the HPI.          Objective:     /70 (BP Location: Left arm, Patient Position: Sitting)   Pulse 71   Ht 177.8 cm (70\")   Wt 91.4 kg (201 lb 9.6 oz)   SpO2 98%   BMI 28.93 kg/m²      Physical Exam  Constitutional: Patient appears well-developed and well-nourished.   HENT: HEENT exam unremarkable.   Neck: Neck supple. No JVD present. No carotid bruits.   Cardiovascular: Normal rate, regular rhythm and normal heart sounds. No murmur heard.   2+ symmetric pulses.   Pulmonary/Chest: Breath sounds normal. Does not exhibit tenderness.   Abdominal: Abdomen benign.   Musculoskeletal: Does not exhibit edema.   Neurological: Neurological exam unremarkable.   Vitals reviewed.    Data Review:     No recent laboratory studies available for review today.         Assessment:      Diagnosis Plan   1. Coronary artery disease involving native coronary artery of native heart without angina pectoris  Stable cardiac status. No current angina or CHF symptoms. Continue on aspirin 81 mg daily for antiplatelet therapy.  Continue rest of the current GDMT.   2. Palpitations  No recurrence.  Continue carvedilol.   3. Essential hypertension  Continue on amlodipine 10 mg daily, carvedilol 25 mg BID, and lisinopril 20 mg BID for hypertension.     4. Mixed hyperlipidemia  Well " controlled. Continue on atorvastatin 40 mg daily for hyperlipidemia.  Follow-up with PCP for monitoring.     Plan:   Elevated blood pressure discussed with the patient, he monitors at home and reports acceptable readings.  I have recommended restriction of salt/sodium and continuous monitoring of blood pressure with goal of 140 systolic or less.  Port in case of elevations as we will consider further adjustment of medical treatment  Overall cardiac status is stable without current angina or CHF symptoms.   Continue current medications.   FU in 6 MO, sooner as needed.  Thank you for allowing us to participate in the care of your patient.       Scribed for Michelle Frank MD by Chana Fisher. 4/16/2023 10:35 EDT         I, Michelle Frank MD, personally performed the services described in this documentation as scribed by the above named individual in my presence, and it is both accurate and complete.  4/16/2023  11:09 EDT      Please note that portions of this note may have been completed with a voice recognition program. Efforts were made to edit the dictations, but occasionally words are mistranscribed.

## 2023-10-03 ENCOUNTER — OFFICE VISIT (OUTPATIENT)
Dept: UROLOGY | Facility: CLINIC | Age: 83
End: 2023-10-03
Payer: MEDICARE

## 2023-10-03 VITALS
HEART RATE: 111 BPM | DIASTOLIC BLOOD PRESSURE: 65 MMHG | BODY MASS INDEX: 28.63 KG/M2 | WEIGHT: 200 LBS | SYSTOLIC BLOOD PRESSURE: 124 MMHG | HEIGHT: 70 IN

## 2023-10-03 DIAGNOSIS — R97.20 ELEVATED PROSTATE SPECIFIC ANTIGEN (PSA): Primary | ICD-10-CM

## 2023-10-03 LAB — PSA SERPL-MCNC: 3.02 NG/ML (ref 0–4)

## 2023-10-03 PROCEDURE — 99213 OFFICE O/P EST LOW 20 MIN: CPT | Performed by: UROLOGY

## 2023-10-03 PROCEDURE — 36415 COLL VENOUS BLD VENIPUNCTURE: CPT | Performed by: UROLOGY

## 2023-10-03 PROCEDURE — 3078F DIAST BP <80 MM HG: CPT | Performed by: UROLOGY

## 2023-10-03 PROCEDURE — 84153 ASSAY OF PSA TOTAL: CPT | Performed by: UROLOGY

## 2023-10-03 PROCEDURE — 1159F MED LIST DOCD IN RCRD: CPT | Performed by: UROLOGY

## 2023-10-03 PROCEDURE — 3074F SYST BP LT 130 MM HG: CPT | Performed by: UROLOGY

## 2023-10-03 PROCEDURE — 1160F RVW MEDS BY RX/DR IN RCRD: CPT | Performed by: UROLOGY

## 2023-10-03 NOTE — PROGRESS NOTES
Chief Complaint:      Chief Complaint   Patient presents with    Disorder of prostate       HPI:   82 y.o. male returns today.  He is hard of hearing.  He reports no lower urinary tract symptomatology, particularly irritative symptoms such as frequency, urgency, dysuria, and obstructive symptomatology, particularly dribbling, hesitancy, and intermittency.  Hospitalized in [facility] for atrial fibrillation.  Digital rectal is unremarkable with a 30 g smooth prostate PSA is pending at the time of this dictation.    Past Medical History:     Past Medical History:   Diagnosis Date    Chronic kidney disease     Coronary artery disease     Hyperlipidemia     Hypertension        Current Meds:     Current Outpatient Medications   Medication Sig Dispense Refill    ALLOPURINOL PO Take 100 mg by mouth daily.      amLODIPine (NORVASC) 10 MG tablet Take 1 tablet by mouth Daily. 90 tablet 3    aspirin 81 MG EC tablet Take 1 tablet by mouth Daily.      atorvastatin (Lipitor) 40 MG tablet Take 1 tablet by mouth Daily. 90 tablet 3    carvedilol (COREG) 25 MG tablet Take 1 tablet by mouth 2 (Two) Times a Day. 180 tablet 3    lisinopril (PRINIVIL,ZESTRIL) 20 MG tablet Take 1 tablet by mouth 2 (Two) Times a Day. 180 tablet 3    omeprazole (PriLOSEC) 20 MG capsule Take 1 capsule by mouth Daily.       No current facility-administered medications for this visit.        Allergies:      No Known Allergies     Past Surgical History:     Past Surgical History:   Procedure Laterality Date    CORONARY ANGIOPLASTY WITH STENT PLACEMENT Left 05/02/2016       Social History:     Social History     Socioeconomic History    Marital status:    Tobacco Use    Smoking status: Never     Passive exposure: Past    Smokeless tobacco: Never   Vaping Use    Vaping Use: Never used   Substance and Sexual Activity    Alcohol use: No    Drug use: No    Sexual activity: Defer       Family History:     Family History   Problem Relation Age of Onset     No Known Problems Mother     No Known Problems Father        Review of Systems:     Review of Systems   Constitutional: Negative.    HENT: Negative.     Eyes: Negative.    Respiratory: Negative.     Cardiovascular: Negative.    Gastrointestinal: Negative.    Endocrine: Negative.    Musculoskeletal: Negative.    Allergic/Immunologic: Negative.    Neurological: Negative.    Hematological: Negative.    Psychiatric/Behavioral: Negative.       Physical Exam:     Physical Exam  Vitals and nursing note reviewed.   Constitutional:       Appearance: He is well-developed.   HENT:      Head: Normocephalic and atraumatic.   Eyes:      Conjunctiva/sclera: Conjunctivae normal.      Pupils: Pupils are equal, round, and reactive to light.   Cardiovascular:      Rate and Rhythm: Normal rate and regular rhythm.      Heart sounds: Normal heart sounds.   Pulmonary:      Effort: Pulmonary effort is normal.      Breath sounds: Normal breath sounds.   Abdominal:      General: Bowel sounds are normal.      Palpations: Abdomen is soft.   Genitourinary:     Prostate: Normal.      Rectum: Normal.   Musculoskeletal:         General: Normal range of motion.      Cervical back: Normal range of motion.   Skin:     General: Skin is warm and dry.   Neurological:      Mental Status: He is alert and oriented to person, place, and time.      Deep Tendon Reflexes: Reflexes are normal and symmetric.   Psychiatric:         Behavior: Behavior normal.         Thought Content: Thought content normal.         Judgment: Judgment normal.       I have reviewed the following portions of the patient's history: Allergies, current medications, past family history, past medical history, past social history, past surgical history, problem list, and ROS and confirm it is accurate.    Recent Image (CT and/or KUB):      CT Abdomen and Pelvis: No results found for this or any previous visit.       CT Stone Protocol: No results found for this or any previous visit.        KUB: No results found for this or any previous visit.       Labs (past 3 months):      No visits with results within 3 Month(s) from this visit.   Latest known visit with results is:   Office Visit on 09/20/2022   Component Date Value Ref Range Status    PSA 09/20/2022 3.600  0.000 - 4.000 ng/mL Final        Procedure:       Assessment/Plan:   BPH: Discussed the pathophysiology of BPH and obstruction.  We discussed the static and dynamic effects of BPH as well as using 5 alpha reductase inhibitors versus alpha blockade.  We discussed the indications for transurethral surgery as well and/ or other therapeutic options available including all of the newer techniques.  He is asymptomatic he is on no treatment  PSA testing-I am recommending a PSA blood test that stands for prostate specific antigen.  I discussed the pathophysiology of PSA testing indicating its use in the diagnosis and management of prostate cancer.  I discussed the normal range being 0 to 4, but more appropriately being much closer to 0 to 2 in a normal male.  I discussed the fact that after a certain age we don't recommend PSA testing especially in view of numerous comorbidities, that this will not be a useful test.  I discussed many of the things that can artificially raise PSA including a recent infection, urinary tract infection, and recent sexual intercourse, or even the type of movement such as manipulation of the prostate from riding a bicycle.  After all this is taken into account when the test is reviewed, the most important use of PSA is the velocity measurement.  In other words, the change of PSA with time is a very important factor in the use and that we look for greater than 20% rise over a year to help us make the prediction of prostate cancer.  I also discussed that the use with prostate cancer indicating that after a radical prostatectomy, the PSA should be 0 and any rise indicates an early biochemical recurrence.          This  document has been electronically signed by IVETH MINOR MD October 3, 2023 09:27 EDT    Dictated Utilizing Dragon Dictation: Part of this note may be an electronic transcription/translation of spoken language to printed text using the Dragon Dictation System.

## 2023-10-09 ENCOUNTER — TELEPHONE (OUTPATIENT)
Dept: CARDIOLOGY | Facility: CLINIC | Age: 83
End: 2023-10-09
Payer: MEDICARE

## 2023-10-09 NOTE — TELEPHONE ENCOUNTER
Caller: DARSHAN DAVIS    Relationship to patient: Self    Best call back number: [phone]    Type of visit: FOLLOW UP     Requested date: ANY      Additional notes: PATIENT WAS HOSPITALIZED AT [facility] A COUPLE OF WEEKS AGO FOR AFIB WITH RVR. WAS ADVISED TO FOLLOW UP WITH CARDIOLOGIST FOR A POSSIBLE CARDIOVERSION OR ABLATION. ARE NEEDING TO DISCUSS HIS MEDICATIONS.

## 2023-10-11 NOTE — TELEPHONE ENCOUNTER
Contacted patient to follow up with hospital visit. Patient states he is feeling much better and is taking the medications prescribed by [facility]. He is taking eliquis and cardizem.     Added patient to next [facility] clinical day. Patient may need follow up with EP.

## 2023-10-24 ENCOUNTER — OFFICE VISIT (OUTPATIENT)
Dept: CARDIOLOGY | Facility: CLINIC | Age: 83
End: 2023-10-24
Payer: MEDICARE

## 2023-10-24 VITALS
SYSTOLIC BLOOD PRESSURE: 154 MMHG | BODY MASS INDEX: 28.83 KG/M2 | DIASTOLIC BLOOD PRESSURE: 90 MMHG | HEIGHT: 70 IN | HEART RATE: 81 BPM | OXYGEN SATURATION: 97 % | WEIGHT: 201.4 LBS

## 2023-10-24 DIAGNOSIS — E78.2 MIXED HYPERLIPIDEMIA: ICD-10-CM

## 2023-10-24 DIAGNOSIS — I10 ESSENTIAL HYPERTENSION: ICD-10-CM

## 2023-10-24 DIAGNOSIS — R00.2 PALPITATIONS: ICD-10-CM

## 2023-10-24 DIAGNOSIS — I48.0 PAROXYSMAL ATRIAL FIBRILLATION: Primary | ICD-10-CM

## 2023-10-24 DIAGNOSIS — I25.10 CORONARY ARTERY DISEASE INVOLVING NATIVE CORONARY ARTERY OF NATIVE HEART WITHOUT ANGINA PECTORIS: Primary | ICD-10-CM

## 2023-10-24 PROCEDURE — 99214 OFFICE O/P EST MOD 30 MIN: CPT | Performed by: INTERNAL MEDICINE

## 2023-10-24 PROCEDURE — 3077F SYST BP >= 140 MM HG: CPT | Performed by: INTERNAL MEDICINE

## 2023-10-24 PROCEDURE — 1160F RVW MEDS BY RX/DR IN RCRD: CPT | Performed by: INTERNAL MEDICINE

## 2023-10-24 PROCEDURE — 1159F MED LIST DOCD IN RCRD: CPT | Performed by: INTERNAL MEDICINE

## 2023-10-24 PROCEDURE — 3080F DIAST BP >= 90 MM HG: CPT | Performed by: INTERNAL MEDICINE

## 2023-10-24 RX ORDER — DILTIAZEM HYDROCHLORIDE 180 MG/1
180 CAPSULE, COATED, EXTENDED RELEASE ORAL DAILY
COMMUNITY
End: 2023-10-24 | Stop reason: SDUPTHER

## 2023-10-24 RX ORDER — LISINOPRIL AND HYDROCHLOROTHIAZIDE 25; 20 MG/1; MG/1
1 TABLET ORAL DAILY
COMMUNITY

## 2023-10-24 RX ORDER — DILTIAZEM HYDROCHLORIDE 240 MG/1
240 CAPSULE, COATED, EXTENDED RELEASE ORAL DAILY
Qty: 90 CAPSULE | Refills: 1 | Status: SHIPPED | OUTPATIENT
Start: 2023-10-24

## 2023-10-24 RX ORDER — HYDROXYZINE PAMOATE 50 MG/1
50 CAPSULE ORAL DAILY
COMMUNITY

## 2023-10-24 NOTE — PROGRESS NOTES
[facility] Cardiology    Encounter Date: 10/24/2023    Patient ID: Zachary Melendez is a 83 y.o. male.  : 1940     PCP: Nerissa Cortez MD       Chief Complaint: Coronary artery disease involving native coronary artery of      PROBLEM LIST:  Coronary artery disease:  NSTEMI, 2016.  LHC, 2016: 80-90% LAD treated with 2.5 x 18 FERNANDO, 50% Cx with normal IFR, 30% RCA. EF 45%.  Echo, 2017: EF 61-65%. Anterior leaflet thickening present. No significant VHD.  Cardiomyopathy:  EF 45% at the time of NSTEMI in 2016, with subsequent normalization.  Echo, 2017: EF 61-65%.  Paroxysmal Atrial Fibrillation  A. 48-hour holter monitor, 3/30/2021: Frequent PVCs including bigeminy. Occasional PACs. Occasional PAT/SVT up to 15 beats. No symptoms.   B. 14-day event monitor, 2022: Sinus rhythm. Occasional APCs, frequent PVCs. Occasional SVT up to 26 beats. No symptoms.  Hypertension.  Dyslipidemia.  GERD.  Obesity.  Nephrolithiasis.  Surgical Hx:  TURP.  Bilateral inguinal repair.  Cholecystectomy.    History of Present Illness  Patient presents today for a follow-up with a history of coronary artery disease, cardiomyopathy, palpitations, and cardiac risk factors. He is presents with his son-in-law. Since last visit, the patient complains of irregular heart beats. Sometimes he will be short of breath. About 1.5 months ago, he went into A-fib with RVR and went to the hospital. It took about 4 days after taking the medication for his heart rate to return to normal. The patient's blood pressure sometimes drops when he is up and around for a while such as while grocery shopping.  This is associated with dizziness.  Patient denies chest pain, orthopnea, palpitations, edema, and syncope.       No Known Allergies      Current Outpatient Medications:     ALLOPURINOL PO, Take 100 mg by mouth daily., Disp: , Rfl:     apixaban (ELIQUIS) 2.5 MG tablet tablet, Take 1 tablet by mouth  "2 (Two) Times a Day., Disp: , Rfl:     aspirin 81 MG EC tablet, Take 1 tablet by mouth Daily., Disp: , Rfl:     atorvastatin (Lipitor) 40 MG tablet, Take 1 tablet by mouth Daily., Disp: 90 tablet, Rfl: 3    carvedilol (COREG) 25 MG tablet, Take 1 tablet by mouth 2 (Two) Times a Day., Disp: 180 tablet, Rfl: 3    dilTIAZem CD (CARDIZEM CD) 180 MG 24 hr capsule, Take 1 capsule by mouth Daily., Disp: , Rfl:     hydrOXYzine pamoate (VISTARIL) 50 MG capsule, Take 1 capsule by mouth Daily., Disp: , Rfl:     lisinopril-hydrochlorothiazide (PRINZIDE,ZESTORETIC) 20-25 MG per tablet, Take 1 tablet by mouth Daily., Disp: , Rfl:     omeprazole (PriLOSEC) 20 MG capsule, Take 1 capsule by mouth Daily., Disp: , Rfl:     The following portions of the patient's history were reviewed and updated as appropriate: allergies, current medications, past family history, past medical history, past social history, past surgical history and problem list.    ROS  Review of Systems   14 point ROS negative except for that listed in the HPI.         Objective:     /90 (BP Location: Left arm, Patient Position: Sitting)   Pulse 81   Ht 177.8 cm (70\")   Wt 91.4 kg (201 lb 6.4 oz)   SpO2 97%   BMI 28.90 kg/m²      Physical Exam  Constitutional: Patient appears well-developed and well-nourished.   HENT: HEENT exam unremarkable.   Neck: Neck supple. No JVD present. No carotid bruits.   Cardiovascular: Normal rate, regular rhythm and normal heart sounds. No murmur heard.   2+ symmetric pulses.   Pulmonary/Chest: Breath sounds normal. Does not exhibit tenderness.   Abdominal: Abdomen benign.   Musculoskeletal: Does not exhibit edema.   Neurological: Neurological exam unremarkable.   Vitals reviewed.    Data Review:     Labs from 02/14/2022:  Chol 117.5  Trig 53.6  HDL 46.4  LDL 59     Procedures             Assessment:      Diagnosis Plan   1. Coronary artery disease involving native coronary artery of native heart without angina pectoris  " Stable cardiac status. No current angina or CHF symptoms. Continue on aspirin 81 mg daily for antiplatelet therapy.  Continue rest of the current GDMT.    2. Paroxysmal Atrial Fibrillation Continue Eliquis 2.5 mg BID.  Continue diltiazem and carvedilol for rate control.   3. Essential hypertension  Continue on carvedilol 25 mg BID, diltiazem 240 mg daily and lisinopril 20 mg BID for hypertension.        4. Mixed hyperlipidemia  Well controlled. Continue on atorvastatin 40 mg daily for hyperlipidemia.        Plan:   His blood pressure is somewhat elevated however due to complaints of occasional orthostasis we will need some permissive hypertension.  Patient encouraged to maintain good hydration and use compression stockings  Complete 48 hour Holter heart monitor for further assessment of dizziness symptoms and to assess atrial fibrillation burden.  Continue current medications.   FU in 6 MO, sooner as needed.  Thank you for allowing us to participate in the care of your patient.     Scribed for Michelle Frank MD by Nano Alvarenga. 10/24/2023 08:50 EDT      I, Michelle Frank MD, personally performed the services described in this documentation as scribed by the above named individual in my presence, and it is both accurate and complete.  11/7/2023  13:16 EST      Please note that portions of this note may have been completed with a voice recognition program. Efforts were made to edit the dictations, but occasionally words are mistranscribed.

## 2023-11-13 ENCOUNTER — TELEPHONE (OUTPATIENT)
Dept: CARDIOLOGY | Facility: CLINIC | Age: 83
End: 2023-11-13
Payer: MEDICARE

## 2023-11-13 NOTE — TELEPHONE ENCOUNTER
Called patient to report Holter monitor results (persistent afib, average HR 74, occasional PVCs)    Patient reports feeling very SOB with minimal activity. 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 Patient reports his BP being low (90s systolic) and feeling sluggish.     Suggested patient cut carvedilol in half and take 12.5mg and continue to monitor BP and HR.     Please advise! Thanks,   Shahida HUANG

## 2023-11-13 NOTE — TELEPHONE ENCOUNTER
Michelle Frank MD  You1 hour ago (3:22 PM)       Shortness of breath is probably due to increased heart rate with exercise, did not cut the dose of carvedilol and further increase diltiazem to twice daily.  Report back after 1 week if not better and we will consider further testing.     Called to relay recommendations to patient. Verbalized understanding and is agreeable to plan of care.

## 2024-03-28 RX ORDER — ATORVASTATIN CALCIUM 40 MG/1
40 TABLET, FILM COATED ORAL DAILY
Qty: 90 TABLET | Refills: 1 | Status: SHIPPED | OUTPATIENT
Start: 2024-03-28

## 2024-03-28 RX ORDER — DILTIAZEM HYDROCHLORIDE 240 MG/1
240 CAPSULE, COATED, EXTENDED RELEASE ORAL DAILY
Qty: 90 CAPSULE | Refills: 0 | Status: SHIPPED | OUTPATIENT
Start: 2024-03-28

## 2024-03-28 RX ORDER — CARVEDILOL 25 MG/1
25 TABLET ORAL 2 TIMES DAILY
Qty: 180 TABLET | Refills: 3 | Status: SHIPPED | OUTPATIENT
Start: 2024-03-28

## 2024-06-03 PROBLEM — I48.0 PAROXYSMAL ATRIAL FIBRILLATION: Status: ACTIVE | Noted: 2024-06-03

## 2024-06-03 NOTE — PROGRESS NOTES
[facility] Cardiology    Encounter Date: 2024    Patient ID: Zachary Melendez is a 83 y.o. male.  : 1940     PCP: Nerissa Cortez MD       Chief Complaint: Paroxysmal atrial fibrillation   and Hypertension      PROBLEM LIST:  Coronary artery disease:  NSTEMI, 2016.  LHC, 2016: 80-90% LAD treated with 2.5 x 18 FERNANDO, 50% Cx with normal IFR, 30% RCA. EF 45%.  Echo, 2017: EF 61-65%. Anterior leaflet thickening present. No significant VHD.  Cardiomyopathy:  EF 45% at the time of NSTEMI in 2016, with subsequent normalization.  Echo, 2017: EF 61-65%.  Paroxysmal Atrial Fibrillation  A. 48-hour holter monitor, 3/30/2021: Frequent PVCs including bigeminy. Occasional PACs. Occasional PAT/SVT up to 15 beats. No symptoms.   B. 14-day event monitor, 2022: Sinus rhythm. Occasional APCs, frequent PVCs. Occasional SVT up to 26 beats. No symptoms.  C. Holter, 10/24/2023: Persistent atrial fibrillation. Avg rate 74 bpm. Occasional PVCs.   Hypertension.  Dyslipidemia.  GERD.  Obesity.  Nephrolithiasis.  Surgical Hx:  TURP.  Bilateral inguinal repair.  Cholecystectomy.    History of Present Illness  Patient presents today for a follow-up with a history of CAD, PAF, and cardiac risk factors. Since last visit, the patient has noted worsening shortness of breath.  He is very hard of hearing, history is provided by his daughter.  Patient is a  and lives independently next door to his daughter.  The daughter states that the patient can barely walk across the house without becoming short of breath.  He has also noted mild edema.  He was given Lasix as needed by his PCP which he takes every now and then for edema.  The daughter reports that his worsening dyspnea has progressed ever since he has developed atrial fibrillation.  Patient blood pressure sometimes runs low, he has been taking his diltiazem at night.  No chest pain palpitations or syncope.    No Known  "Allergies      Current Outpatient Medications:     allopurinol (ZYLOPRIM) 100 MG tablet, Take 1 tablet by mouth Daily., Disp: , Rfl:     apixaban (ELIQUIS) 2.5 MG tablet tablet, Take 1 tablet by mouth 2 (Two) Times a Day., Disp: , Rfl:     aspirin 81 MG EC tablet, Take 1 tablet by mouth Daily., Disp: , Rfl:     atorvastatin (Lipitor) 40 MG tablet, Take 1 tablet by mouth Daily., Disp: 90 tablet, Rfl: 1    dilTIAZem CD (CARDIZEM CD) 240 MG 24 hr capsule, Take 1 capsule by mouth Daily., Disp: 90 capsule, Rfl: 0    hydrOXYzine pamoate (VISTARIL) 50 MG capsule, Take 1 capsule by mouth Daily., Disp: , Rfl:     omeprazole (PriLOSEC) 20 MG capsule, Take 1 capsule by mouth Daily., Disp: , Rfl:     furosemide (LASIX) 20 MG tablet, Take 1 tablet by mouth Daily., Disp: 90 tablet, Rfl: 1    metoprolol succinate XL (TOPROL-XL) 100 MG 24 hr tablet, Take 1 tablet by mouth Daily., Disp: 90 tablet, Rfl: 3    The following portions of the patient's history were reviewed and updated as appropriate: allergies, current medications, past family history, past medical history, past social history, past surgical history and problem list.    ROS  Review of Systems   14 point ROS negative except for that listed in the HPI.         Objective:     /92 (BP Location: Right arm, Patient Position: Sitting)   Pulse 75   Ht 177.8 cm (70\")   Wt 91 kg (200 lb 9.6 oz)   SpO2 96%   BMI 28.78 kg/m²      Physical Exam  Constitutional: Patient appears well-developed and well-nourished.   HENT: HEENT exam unremarkable.   Neck: Neck supple. No JVD present. No carotid bruits.   Cardiovascular: Normal rate, regular rhythm and normal heart sounds. No murmur heard.   2+ symmetric pulses.   Pulmonary/Chest: Breath sounds normal. Does not exhibit tenderness.   Abdominal: Abdomen benign.   Musculoskeletal: 1+ edema.  Neurological: Neurological exam unremarkable.   Vitals reviewed.    Data Review:   No recent laboratory studies available for review today.   "   Procedures       Advance Care Planning   ACP discussion was declined by the patient. Patient does not have an advance directive, declines further assistance.           Assessment:      Diagnosis Plan   1. Acute heart failure with preserved ejection fraction (HFpEF)  Needs better control of heart rate, will discontinue carvedilol and start him on Toprol- mg daily.  Continue diltiazem to 40 nightly.  Add Lasix 20 mg daily for fluid management.  Check CMP.  Check echocardiogram   2. Coronary artery disease involving native coronary artery of native heart without angina pectoris  Stable, no current angina, continue aspirin and statin.  Continue beta-blockers.   3. Paroxysmal atrial fibrillation  We are changing carvedilol to Toprol for better rate control, continue Eliquis for anticoagulation.   4. Essential hypertension  Fair control, I expect further improvement with addition of daily diuretics   5. Mixed hyperlipidemia  Continue current statin therapy, check lipid panel and CMP.     Plan:   Worsening dyspnea due to mild acute HFpEF and fluid overload as well as suboptimal rate control.  Discontinue carvedilol, start Toprol- mg daily in the morning and continue Cardizem 240 mg nightly for rate control.  Continue Eliquis for anticoagulation.  Add Lasix 20 mg daily for fluid management.  Check lipid panel and CMP.  Continue to have the current medications.   FU in 3 MO, sooner as needed.  Thank you for allowing us to participate in the care of your patient.       Michelle Frank MD, FACC, [facility]        Please note that portions of this note may have been completed with a voice recognition program. Efforts were made to edit the dictations, but occasionally words are mistranscribed.

## 2024-06-04 ENCOUNTER — OFFICE VISIT (OUTPATIENT)
Age: 84
End: 2024-06-04
Payer: MEDICARE

## 2024-06-04 ENCOUNTER — LAB (OUTPATIENT)
Dept: LAB | Facility: HOSPITAL | Age: 84
End: 2024-06-04
Payer: MEDICARE

## 2024-06-04 VITALS
HEART RATE: 75 BPM | DIASTOLIC BLOOD PRESSURE: 92 MMHG | BODY MASS INDEX: 28.72 KG/M2 | SYSTOLIC BLOOD PRESSURE: 142 MMHG | WEIGHT: 200.6 LBS | OXYGEN SATURATION: 96 % | HEIGHT: 70 IN

## 2024-06-04 DIAGNOSIS — I25.10 CORONARY ARTERY DISEASE INVOLVING NATIVE CORONARY ARTERY OF NATIVE HEART WITHOUT ANGINA PECTORIS: ICD-10-CM

## 2024-06-04 DIAGNOSIS — I48.0 PAROXYSMAL ATRIAL FIBRILLATION: ICD-10-CM

## 2024-06-04 DIAGNOSIS — I50.31 ACUTE HEART FAILURE WITH PRESERVED EJECTION FRACTION (HFPEF): Primary | ICD-10-CM

## 2024-06-04 DIAGNOSIS — I10 ESSENTIAL HYPERTENSION: ICD-10-CM

## 2024-06-04 DIAGNOSIS — E78.2 MIXED HYPERLIPIDEMIA: ICD-10-CM

## 2024-06-04 PROCEDURE — 3080F DIAST BP >= 90 MM HG: CPT | Performed by: INTERNAL MEDICINE

## 2024-06-04 PROCEDURE — 1160F RVW MEDS BY RX/DR IN RCRD: CPT | Performed by: INTERNAL MEDICINE

## 2024-06-04 PROCEDURE — 80061 LIPID PANEL: CPT

## 2024-06-04 PROCEDURE — 3077F SYST BP >= 140 MM HG: CPT | Performed by: INTERNAL MEDICINE

## 2024-06-04 PROCEDURE — 1159F MED LIST DOCD IN RCRD: CPT | Performed by: INTERNAL MEDICINE

## 2024-06-04 PROCEDURE — 80053 COMPREHEN METABOLIC PANEL: CPT

## 2024-06-04 PROCEDURE — 99214 OFFICE O/P EST MOD 30 MIN: CPT | Performed by: INTERNAL MEDICINE

## 2024-06-04 PROCEDURE — 36415 COLL VENOUS BLD VENIPUNCTURE: CPT

## 2024-06-04 RX ORDER — FUROSEMIDE 20 MG/1
20 TABLET ORAL DAILY
Qty: 90 TABLET | Refills: 1 | Status: SHIPPED | OUTPATIENT
Start: 2024-06-04

## 2024-06-04 RX ORDER — METOPROLOL SUCCINATE 100 MG/1
100 TABLET, EXTENDED RELEASE ORAL DAILY
Qty: 90 TABLET | Refills: 3 | Status: SHIPPED | OUTPATIENT
Start: 2024-06-04

## 2024-06-05 LAB
ALBUMIN SERPL-MCNC: 4 G/DL (ref 3.5–5.2)
ALBUMIN/GLOB SERPL: 1.6 G/DL
ALP SERPL-CCNC: 147 U/L (ref 39–117)
ALT SERPL W P-5'-P-CCNC: 40 U/L (ref 1–41)
ANION GAP SERPL CALCULATED.3IONS-SCNC: 8 MMOL/L (ref 5–15)
AST SERPL-CCNC: 30 U/L (ref 1–40)
BILIRUB SERPL-MCNC: 1.2 MG/DL (ref 0–1.2)
BUN SERPL-MCNC: 27 MG/DL (ref 8–23)
BUN/CREAT SERPL: 29.3 (ref 7–25)
CALCIUM SPEC-SCNC: 8.5 MG/DL (ref 8.6–10.5)
CHLORIDE SERPL-SCNC: 105 MMOL/L (ref 98–107)
CHOLEST SERPL-MCNC: 87 MG/DL (ref 0–200)
CO2 SERPL-SCNC: 27 MMOL/L (ref 22–29)
CREAT SERPL-MCNC: 0.92 MG/DL (ref 0.76–1.27)
EGFRCR SERPLBLD CKD-EPI 2021: 83.1 ML/MIN/1.73
GLOBULIN UR ELPH-MCNC: 2.5 GM/DL
GLUCOSE SERPL-MCNC: 115 MG/DL (ref 65–99)
HDLC SERPL-MCNC: 36 MG/DL (ref 40–60)
LDLC SERPL CALC-MCNC: 37 MG/DL (ref 0–100)
LDLC/HDLC SERPL: 1.08 {RATIO}
POTASSIUM SERPL-SCNC: 4.3 MMOL/L (ref 3.5–5.2)
PROT SERPL-MCNC: 6.5 G/DL (ref 6–8.5)
SODIUM SERPL-SCNC: 140 MMOL/L (ref 136–145)
TRIGL SERPL-MCNC: 60 MG/DL (ref 0–150)
VLDLC SERPL-MCNC: 14 MG/DL (ref 5–40)

## 2024-06-28 ENCOUNTER — HOSPITAL ENCOUNTER (OUTPATIENT)
Dept: CARDIOLOGY | Facility: HOSPITAL | Age: 84
Discharge: HOME | End: 2024-06-28
Payer: MEDICARE

## 2024-06-28 DIAGNOSIS — I48.0 PAROXYSMAL ATRIAL FIBRILLATION: ICD-10-CM

## 2024-06-28 DIAGNOSIS — I10 ESSENTIAL HYPERTENSION: ICD-10-CM

## 2024-06-28 DIAGNOSIS — I25.10 CORONARY ARTERY DISEASE INVOLVING NATIVE CORONARY ARTERY OF NATIVE HEART WITHOUT ANGINA PECTORIS: ICD-10-CM

## 2024-06-28 DIAGNOSIS — E78.2 MIXED HYPERLIPIDEMIA: ICD-10-CM

## 2024-06-28 LAB
BH CV ECHO MEAS - ACS: 1.5 CM
BH CV ECHO MEAS - AO MAX PG: 5.7 MMHG
BH CV ECHO MEAS - AO MEAN PG: 3 MMHG
BH CV ECHO MEAS - AO ROOT DIAM: 3 CM
BH CV ECHO MEAS - AO V2 MAX: 119 CM/SEC
BH CV ECHO MEAS - AO V2 VTI: 23.6 CM
BH CV ECHO MEAS - EDV(CUBED): 70.4 ML
BH CV ECHO MEAS - EDV(MOD-SP4): 73.8 ML
BH CV ECHO MEAS - EF(MOD-BP): 62 %
BH CV ECHO MEAS - EF(MOD-SP4): 62.3 %
BH CV ECHO MEAS - ESV(CUBED): 16.5 ML
BH CV ECHO MEAS - ESV(MOD-SP4): 27.8 ML
BH CV ECHO MEAS - FS: 38.4 %
BH CV ECHO MEAS - IVS/LVPW: 1.09 CM
BH CV ECHO MEAS - IVSD: 1.28 CM
BH CV ECHO MEAS - LA DIMENSION: 4.5 CM
BH CV ECHO MEAS - LAT PEAK E' VEL: 8.9 CM/SEC
BH CV ECHO MEAS - LV DIASTOLIC VOL/BSA (35-75): 35.4 CM2
BH CV ECHO MEAS - LV MASS(C)D: 179 GRAMS
BH CV ECHO MEAS - LV SYSTOLIC VOL/BSA (12-30): 13.3 CM2
BH CV ECHO MEAS - LVIDD: 4.1 CM
BH CV ECHO MEAS - LVIDS: 2.5 CM
BH CV ECHO MEAS - LVOT AREA: 3.1 CM2
BH CV ECHO MEAS - LVOT DIAM: 2 CM
BH CV ECHO MEAS - LVPWD: 1.17 CM
BH CV ECHO MEAS - MED PEAK E' VEL: 8.4 CM/SEC
BH CV ECHO MEAS - MR MAX PG: 115.8 MMHG
BH CV ECHO MEAS - MR MAX VEL: 538 CM/SEC
BH CV ECHO MEAS - MR MEAN PG: 67 MMHG
BH CV ECHO MEAS - MR MEAN VEL: 375 CM/SEC
BH CV ECHO MEAS - MR VTI: 171 CM
BH CV ECHO MEAS - MV A MAX VEL: 24.3 CM/SEC
BH CV ECHO MEAS - MV E MAX VEL: 92.2 CM/SEC
BH CV ECHO MEAS - MV E/A: 3.8
BH CV ECHO MEAS - PA ACC TIME: 0.09 SEC
BH CV ECHO MEAS - RAP SYSTOLE: 10 MMHG
BH CV ECHO MEAS - RVSP: 59 MMHG
BH CV ECHO MEAS - SV(MOD-SP4): 46 ML
BH CV ECHO MEAS - SVI(MOD-SP4): 22 ML/M2
BH CV ECHO MEAS - TAPSE (>1.6): 1.34 CM
BH CV ECHO MEAS - TR MAX PG: 49 MMHG
BH CV ECHO MEAS - TR MAX VEL: 350 CM/SEC
BH CV ECHO MEASUREMENTS AVERAGE E/E' RATIO: 10.66
LEFT ATRIUM VOLUME INDEX: 41.4 ML/M2

## 2024-06-28 PROCEDURE — 93306 TTE W/DOPPLER COMPLETE: CPT

## 2024-07-08 RX ORDER — ATORVASTATIN CALCIUM 40 MG/1
40 TABLET, FILM COATED ORAL DAILY
Qty: 90 TABLET | Refills: 1 | Status: SHIPPED | OUTPATIENT
Start: 2024-07-08

## 2024-07-08 RX ORDER — DILTIAZEM HYDROCHLORIDE 240 MG/1
240 CAPSULE, COATED, EXTENDED RELEASE ORAL DAILY
Qty: 90 CAPSULE | Refills: 0 | Status: SHIPPED | OUTPATIENT
Start: 2024-07-08

## 2024-07-25 ENCOUNTER — TELEPHONE (OUTPATIENT)
Dept: CARDIOLOGY | Facility: CLINIC | Age: 84
End: 2024-07-25
Payer: MEDICARE

## 2024-07-25 NOTE — TELEPHONE ENCOUNTER
Called daughter and went over results above she voiced understanding and states pt s/s have greatly improved since medication changes

## 2024-07-25 NOTE — TELEPHONE ENCOUNTER
Please call patient's daughter and let her know that Mr. Melendez ejection fraction is normal.  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  This is something that we manage medically.  He he also does have something that we call pulmonary hypertension that we will manage medically.  Please see if his symptoms have improved since medication changes back in early June.

## 2024-09-30 PROBLEM — E78.5 DYSLIPIDEMIA: Status: ACTIVE | Noted: 2024-09-30

## 2024-10-01 ENCOUNTER — OFFICE VISIT (OUTPATIENT)
Age: 84
End: 2024-10-01
Payer: MEDICARE

## 2024-10-01 VITALS
BODY MASS INDEX: 28.58 KG/M2 | HEART RATE: 77 BPM | WEIGHT: 199.6 LBS | HEIGHT: 70 IN | SYSTOLIC BLOOD PRESSURE: 152 MMHG | DIASTOLIC BLOOD PRESSURE: 78 MMHG | OXYGEN SATURATION: 96 %

## 2024-10-01 DIAGNOSIS — I50.31 ACUTE HEART FAILURE WITH PRESERVED EJECTION FRACTION (HFPEF): ICD-10-CM

## 2024-10-01 DIAGNOSIS — I10 ESSENTIAL HYPERTENSION: ICD-10-CM

## 2024-10-01 DIAGNOSIS — I25.10 CORONARY ARTERY DISEASE INVOLVING NATIVE CORONARY ARTERY OF NATIVE HEART WITHOUT ANGINA PECTORIS: Primary | ICD-10-CM

## 2024-10-01 DIAGNOSIS — I48.0 PAROXYSMAL ATRIAL FIBRILLATION: ICD-10-CM

## 2024-10-01 DIAGNOSIS — E78.5 DYSLIPIDEMIA: ICD-10-CM

## 2024-10-01 PROCEDURE — 99214 OFFICE O/P EST MOD 30 MIN: CPT

## 2024-10-01 PROCEDURE — 93000 ELECTROCARDIOGRAM COMPLETE: CPT

## 2024-10-01 PROCEDURE — 1159F MED LIST DOCD IN RCRD: CPT

## 2024-10-01 PROCEDURE — 1160F RVW MEDS BY RX/DR IN RCRD: CPT

## 2024-10-01 PROCEDURE — 3077F SYST BP >= 140 MM HG: CPT

## 2024-10-01 PROCEDURE — 3078F DIAST BP <80 MM HG: CPT

## 2024-10-01 NOTE — PROGRESS NOTES
[facility] Cardiology    Encounter Date: 10/01/2024    Patient ID: Zachary Melendez is a 83 y.o. male.  : 1940     PCP: Nerissa Cortez MD       Chief Complaint: Paroxysmal atrial fibrillation, Dizziness, Shortness of Breath, and Hypertension      PROBLEM LIST:  Coronary artery disease:  NSTEMI, 2016.  LHC, 2016: 80-90% LAD treated with 2.5 x 18 FERNANDO, 50% Cx with normal IFR, 30% RCA. EF 45%.  Echo, 2017: EF 61-65%. Anterior leaflet thickening present. No significant VHD.  Echo, 2024: EF 62%. Moderate to severe TR with RVSP 59 mmHg. Moderate to severe pulmonary hypertension.  Cardiomyopathy:  EF 45% at the time of NSTEMI in , with subsequent normalization.  Echo, 2017: EF 61-65%.  Paroxysmal Atrial Fibrillation  48-hour holter monitor, 3/30/2021: Frequent PVCs including bigeminy. Occasional PACs. Occasional PAT/SVT up to 15 beats. No symptoms.   14-day event monitor, 2022: Sinus rhythm. Occasional APCs, frequent PVCs. Occasional SVT up to 26 beats. No symptoms.  Holter, 10/24/2023: Persistent atrial fibrillation. Avg rate 74 bpm. Occasional PVCs.   Hypertension.  Dyslipidemia.  GERD.  Obesity.  Nephrolithiasis.  Surgical Hx:  TURP.  Bilateral inguinal repair.  Cholecystectomy.    History of Present Illness  Patient presents today for a follow-up with a history of CAD, HFpEF, PAF, and cardiac risk factors. Since last visit, patient reports that his blood pressure has been much better controlled at home.  Reports that it has been running in the 120s and 130s systolic.  Is still having some shortness of breath with exertion and when he bends over.  No chest pain or pressure.  Echocardiogram did show moderate to severe TR with moderate to severe pulmonary hypertension.  Has been compliant with his medications.  Denies chest pain or pressure, palpitations, orthopnea, presyncope or syncope.    No Known Allergies      Current Outpatient  "Medications:     allopurinol (ZYLOPRIM) 100 MG tablet, Take 1 tablet by mouth Daily., Disp: , Rfl:     apixaban (ELIQUIS) 2.5 MG tablet tablet, Take 1 tablet by mouth Daily. Pt said he is only taking Eliquis once a day, Disp: , Rfl:     atorvastatin (Lipitor) 40 MG tablet, Take 1 tablet by mouth Daily., Disp: 90 tablet, Rfl: 1    dilTIAZem CD (CARDIZEM CD) 240 MG 24 hr capsule, Take 1 capsule by mouth Daily., Disp: 90 capsule, Rfl: 0    furosemide (LASIX) 20 MG tablet, Take 1 tablet by mouth Daily., Disp: 90 tablet, Rfl: 1    hydrOXYzine pamoate (VISTARIL) 50 MG capsule, Take 1 capsule by mouth Daily., Disp: , Rfl:     metoprolol succinate XL (TOPROL-XL) 100 MG 24 hr tablet, Take 1 tablet by mouth Daily., Disp: 90 tablet, Rfl: 3    omeprazole (PriLOSEC) 20 MG capsule, Take 1 capsule by mouth Daily., Disp: , Rfl:     aspirin 81 MG EC tablet, Take 1 tablet by mouth Daily. (Patient not taking: Reported on 10/1/2024), Disp: , Rfl:     The following portions of the patient's history were reviewed and updated as appropriate: allergies, current medications, past family history, past medical history, past social history, past surgical history and problem list.    ROS  Review of Systems   14 point ROS negative except for that listed in the HPI.         Objective:     /78 (BP Location: Left arm, Patient Position: Sitting)   Pulse 77   Ht 177.8 cm (70\")   Wt 90.5 kg (199 lb 9.6 oz)   SpO2 96%   BMI 28.64 kg/m²      Physical Exam  Constitutional: Patient appears well-developed and well-nourished.   HENT: HEENT exam unremarkable.   Neck: Neck supple. No JVD present.   Cardiovascular: Normal rate, regular rhythm and normal heart sounds.  Systolic ejection murmur at left lower sternal border and apex  Pulmonary/Chest: Breath sounds normal.  Abdominal: Abdomen benign.   Musculoskeletal: Does not exhibit edema.   Neurological: Neurological exam unremarkable.   Vitals reviewed.    Data Review:   Lab Results   Component " Value Date    GLUCOSE 115 (H) 06/04/2024    BUN 27 (H) 06/04/2024    CREATININE 0.92 06/04/2024    EGFR 83.1 06/04/2024    BCR 29.3 (H) 06/04/2024     06/04/2024    K 4.3 06/04/2024    CO2 27.0 06/04/2024    CALCIUM 8.5 (L) 06/04/2024    ALBUMIN 4.0 06/04/2024    AST 30 06/04/2024    ALT 40 06/04/2024     Lab Results   Component Value Date    CHOL 87 06/04/2024    TRIG 60 06/04/2024    HDL 36 (L) 06/04/2024    LDL 37 06/04/2024        ECG 12 Lead    Date/Time: 10/1/2024 10:21 AM  Performed by: Marline Montes PA-C    Authorized by: Marline Montes PA-C  Comparison: compared with previous ECG from 6/28/2022  Comparison to previous ECG: Now in atrial fibrillation  Rhythm: atrial fibrillation  BPM: 78    Clinical impression: abnormal EKG             Advance Care Planning   ACP discussion was held with the patient during this visit. Patient does not have an advance directive, declines further assistance.           Assessment:      Diagnosis Plan   1. Coronary artery disease involving native coronary artery of native heart without angina pectoris  Patient does have a history of coronary artery disease and is having dyspnea on exertion.  While this is likely related to valvular heart disease and pulmonary hypertension, we have recommended stress testing for ischemic evaluation as he has not had an evaluation in quite some time.      Stress Test With Myocardial Perfusion One Day      2. Acute heart failure with preserved ejection fraction (HFpEF)  Appears euvolemic in the office today.  Continue Toprol and Lasix.  Consider addition of low-dose losartan at follow-up if blood pressure will allow.      3. Paroxysmal atrial fibrillation  In atrial fibrillation today.  A-fib appears to be more persistent rather than paroxysmal.  Continue Eliquis 2.5 mg twice daily for anticoagulation.  Continue Toprol for rate control.      4. Essential hypertension  Evaded in the office today but well-controlled at home.  Keep a  log and call our office for SBP greater than 140      5. Dyslipidemia  Continue Lipitor 40 mg daily.        Patient with dyspnea on exertion which is likely multifactorial.  We are obtaining a stress test for ischemic evaluation.  However, advised the patient to begin walking at least in 5-minute intervals every 2 hours for improvement in conditioning.  Continue current medications.   FU in 3 MO, sooner as needed.  Thank you for allowing us to participate in the care of your patient.       Marline Montes PA-C    Please note that portions of this note may have been completed with a voice recognition program. Efforts were made to edit the dictations, but occasionally words are mistranscribed.

## 2024-11-11 RX ORDER — METOPROLOL SUCCINATE 100 MG/1
100 TABLET, EXTENDED RELEASE ORAL DAILY
Qty: 90 TABLET | Refills: 3 | Status: SHIPPED | OUTPATIENT
Start: 2024-11-11

## 2024-11-11 RX ORDER — DILTIAZEM HYDROCHLORIDE 240 MG/1
240 CAPSULE, COATED, EXTENDED RELEASE ORAL DAILY
Qty: 90 CAPSULE | Refills: 0 | Status: SHIPPED | OUTPATIENT
Start: 2024-11-11

## 2024-11-20 ENCOUNTER — HOSPITAL ENCOUNTER (OUTPATIENT)
Dept: NUCLEAR MEDICINE | Facility: HOSPITAL | Age: 84
End: 2024-11-20
Payer: MEDICARE

## 2024-11-20 ENCOUNTER — HOSPITAL ENCOUNTER (OUTPATIENT)
Dept: CARDIOLOGY | Facility: HOSPITAL | Age: 84
Discharge: HOME | End: 2024-11-20
Payer: MEDICARE

## 2024-11-20 DIAGNOSIS — I25.10 CORONARY ARTERY DISEASE INVOLVING NATIVE CORONARY ARTERY OF NATIVE HEART WITHOUT ANGINA PECTORIS: ICD-10-CM

## 2024-11-25 ENCOUNTER — HOSPITAL ENCOUNTER (OUTPATIENT)
Dept: NUCLEAR MEDICINE | Facility: HOSPITAL | Age: 84
Discharge: HOME | End: 2024-11-25
Payer: MEDICARE

## 2024-11-25 ENCOUNTER — HOSPITAL ENCOUNTER (OUTPATIENT)
Dept: CARDIOLOGY | Facility: HOSPITAL | Age: 84
Discharge: HOME | End: 2024-11-25
Payer: MEDICARE

## 2024-11-25 LAB
BH CV NUCLEAR PRIOR STUDY: 3
BH CV REST NUCLEAR ISOTOPE DOSE: 10.1 MCI
BH CV STRESS BP STAGE 1: NORMAL
BH CV STRESS COMMENTS STAGE 1: NORMAL
BH CV STRESS DOSE REGADENOSON STAGE 1: 0.4
BH CV STRESS DURATION MIN STAGE 1: 0
BH CV STRESS DURATION SEC STAGE 1: 10
BH CV STRESS HR STAGE 1: 94
BH CV STRESS NUCLEAR ISOTOPE DOSE: 32.4 MCI
BH CV STRESS PROTOCOL 1: NORMAL
BH CV STRESS RECOVERY BP: NORMAL MMHG
BH CV STRESS RECOVERY HR: 85 BPM
BH CV STRESS STAGE 1: 1
LV EF NUC BP: 63 %
MAXIMAL PREDICTED HEART RATE: 137 BPM
PERCENT MAX PREDICTED HR: 68.61 %
STRESS BASELINE BP: NORMAL MMHG
STRESS BASELINE HR: 74 BPM
STRESS PERCENT HR: 81 %
STRESS POST PEAK BP: NORMAL MMHG
STRESS POST PEAK HR: 94 BPM
STRESS TARGET HR: 116 BPM

## 2024-11-25 PROCEDURE — 78452 HT MUSCLE IMAGE SPECT MULT: CPT | Performed by: INTERNAL MEDICINE

## 2024-11-25 PROCEDURE — 34310000005 TECHNETIUM SESTAMIBI

## 2024-11-25 PROCEDURE — A9500 TC99M SESTAMIBI: HCPCS

## 2024-11-25 PROCEDURE — 93018 CV STRESS TEST I&R ONLY: CPT | Performed by: INTERNAL MEDICINE

## 2024-11-25 PROCEDURE — 25010000002 REGADENOSON 0.4 MG/5ML SOLUTION

## 2024-11-25 PROCEDURE — 93017 CV STRESS TEST TRACING ONLY: CPT

## 2024-11-25 PROCEDURE — 78452 HT MUSCLE IMAGE SPECT MULT: CPT

## 2024-11-25 RX ORDER — REGADENOSON 0.08 MG/ML
0.4 INJECTION, SOLUTION INTRAVENOUS
Status: COMPLETED | OUTPATIENT
Start: 2024-11-25 | End: 2024-11-25

## 2024-11-25 RX ADMIN — TECHNETIUM TC 99M SESTAMIBI 1 DOSE: 1 INJECTION INTRAVENOUS at 08:01

## 2024-11-25 RX ADMIN — TECHNETIUM TC 99M SESTAMIBI 1 DOSE: 1 INJECTION INTRAVENOUS at 09:19

## 2024-11-25 RX ADMIN — REGADENOSON 0.4 MG: 0.08 INJECTION, SOLUTION INTRAVENOUS at 09:19

## 2025-01-10 RX ORDER — FUROSEMIDE 20 MG/1
20 TABLET ORAL DAILY
Qty: 90 TABLET | Refills: 1 | Status: SHIPPED | OUTPATIENT
Start: 2025-01-10

## 2025-01-10 RX ORDER — ATORVASTATIN CALCIUM 40 MG/1
40 TABLET, FILM COATED ORAL DAILY
Qty: 90 TABLET | Refills: 1 | Status: SHIPPED | OUTPATIENT
Start: 2025-01-10

## 2025-01-10 NOTE — TELEPHONE ENCOUNTER
Lab Results   Component Value Date    GLUCOSE 115 (H) 06/04/2024    CALCIUM 8.5 (L) 06/04/2024     06/04/2024    K 4.3 06/04/2024    CO2 27.0 06/04/2024     06/04/2024    BUN 27 (H) 06/04/2024    CREATININE 0.92 06/04/2024    EGFR 83.1 06/04/2024    BCR 29.3 (H) 06/04/2024    ANIONGAP 8.0 06/04/2024     Lab Results   Component Value Date    CHOL 87 06/04/2024    CHLPL 153 05/02/2016    TRIG 60 06/04/2024    HDL 36 (L) 06/04/2024    LDL 37 06/04/2024

## 2025-01-21 ENCOUNTER — OFFICE VISIT (OUTPATIENT)
Dept: UROLOGY | Facility: CLINIC | Age: 85
End: 2025-01-21
Payer: MEDICARE

## 2025-01-21 VITALS
SYSTOLIC BLOOD PRESSURE: 144 MMHG | HEIGHT: 70 IN | HEART RATE: 61 BPM | DIASTOLIC BLOOD PRESSURE: 75 MMHG | BODY MASS INDEX: 27.54 KG/M2 | WEIGHT: 192.4 LBS

## 2025-01-21 DIAGNOSIS — R97.20 ELEVATED PROSTATE SPECIFIC ANTIGEN (PSA): Primary | ICD-10-CM

## 2025-01-21 PROCEDURE — 3078F DIAST BP <80 MM HG: CPT | Performed by: UROLOGY

## 2025-01-21 PROCEDURE — 3077F SYST BP >= 140 MM HG: CPT | Performed by: UROLOGY

## 2025-01-21 PROCEDURE — 99213 OFFICE O/P EST LOW 20 MIN: CPT | Performed by: UROLOGY

## 2025-01-21 PROCEDURE — 1159F MED LIST DOCD IN RCRD: CPT | Performed by: UROLOGY

## 2025-01-21 PROCEDURE — 84153 ASSAY OF PSA TOTAL: CPT | Performed by: UROLOGY

## 2025-01-21 PROCEDURE — 1160F RVW MEDS BY RX/DR IN RCRD: CPT | Performed by: UROLOGY

## 2025-01-21 PROCEDURE — 36415 COLL VENOUS BLD VENIPUNCTURE: CPT | Performed by: UROLOGY

## 2025-01-21 NOTE — PROGRESS NOTES
Chief Complaint:      Chief Complaint   Patient presents with    Elevated PSA       HPI:   84 y.o. male returns today.  He is extremely hard of hearing.  He is quite feeble.  He has difficulty with hemorrhoids when he takes his water pill it makes him have diarrhea.  He does not need refills.  Exam confirmed a external hemorrhoid not thrombosed.  He reports no lower urinary tract symptomatology, particularly irritative symptoms such as frequency, urgency, dysuria, and obstructive symptomatology, particularly dribbling, hesitancy, and intermittency.  PSA is pending    Past Medical History:     Past Medical History:   Diagnosis Date    Abnormal ECG     Asthma     Atrial fibrillation     CHF (congestive heart failure)     Chronic kidney disease     Coronary artery disease     Hyperlipidemia     Hypertension     Myocardial infarction        Current Meds:     Current Outpatient Medications   Medication Sig Dispense Refill    allopurinol (ZYLOPRIM) 100 MG tablet Take 1 tablet by mouth Daily.      apixaban (ELIQUIS) 2.5 MG tablet tablet Take 1 tablet by mouth Daily. Pt said he is only taking Eliquis once a day      aspirin 81 MG EC tablet Take 1 tablet by mouth Daily.      atorvastatin (Lipitor) 40 MG tablet Take 1 tablet by mouth Daily. 90 tablet 1    dilTIAZem CD (CARDIZEM CD) 240 MG 24 hr capsule Take 1 capsule by mouth Daily. 90 capsule 0    furosemide (LASIX) 20 MG tablet Take 1 tablet by mouth Daily. 90 tablet 1    hydrOXYzine pamoate (VISTARIL) 50 MG capsule Take 1 capsule by mouth Daily.      metoprolol succinate XL (TOPROL-XL) 100 MG 24 hr tablet Take 1 tablet by mouth Daily. 90 tablet 3    omeprazole (PriLOSEC) 20 MG capsule Take 1 capsule by mouth Daily.       No current facility-administered medications for this visit.        Allergies:      No Known Allergies     Past Surgical History:     Past Surgical History:   Procedure Laterality Date    CARDIAC CATHETERIZATION      CORONARY ANGIOPLASTY WITH STENT  PLACEMENT Left 05/02/2016       Social History:     Social History     Socioeconomic History    Marital status:    Tobacco Use    Smoking status: Never     Passive exposure: Past    Smokeless tobacco: Never   Vaping Use    Vaping status: Never Used   Substance and Sexual Activity    Alcohol use: No    Drug use: No    Sexual activity: Not Currently       Family History:     Family History   Problem Relation Age of Onset    No Known Problems Mother     Heart attack Father        Review of Systems:     Review of Systems   Constitutional: Negative.    HENT: Negative.     Eyes: Negative.    Respiratory: Negative.     Cardiovascular: Negative.    Gastrointestinal: Negative.    Endocrine: Negative.    Musculoskeletal: Negative.    Allergic/Immunologic: Negative.    Neurological: Negative.    Hematological: Negative.    Psychiatric/Behavioral: Negative.         Physical Exam:     Physical Exam  Vitals and nursing note reviewed.   Constitutional:       Appearance: He is well-developed.   HENT:      Head: Normocephalic and atraumatic.   Eyes:      Conjunctiva/sclera: Conjunctivae normal.      Pupils: Pupils are equal, round, and reactive to light.   Cardiovascular:      Rate and Rhythm: Normal rate and regular rhythm.      Heart sounds: Normal heart sounds.   Pulmonary:      Effort: Pulmonary effort is normal.      Breath sounds: Normal breath sounds.   Abdominal:      General: Bowel sounds are normal.      Palpations: Abdomen is soft.   Genitourinary:     Comments: Large external hemorrhoids  Musculoskeletal:         General: Normal range of motion.      Cervical back: Normal range of motion.   Skin:     General: Skin is warm and dry.   Neurological:      Mental Status: He is alert and oriented to person, place, and time.      Deep Tendon Reflexes: Reflexes are normal and symmetric.   Psychiatric:         Behavior: Behavior normal.         Thought Content: Thought content normal.         Judgment: Judgment normal.          I have reviewed the following portions of the patient's history: Allergies, current medications, past family history, past medical history, past social history, past surgical history, problem list, and ROS and confirm it is accurate.    Recent Image (CT and/or KUB):      CT Abdomen and Pelvis: No results found for this or any previous visit.       CT Stone Protocol: No results found for this or any previous visit.       KUB: No results found for this or any previous visit.       Labs (past 3 months):      Hospital Outpatient Visit on 11/20/2024   Component Date Value Ref Range Status    Nuclear Prior Study 11/25/2024 3.0   Final    BH CV REST NUCLEAR ISOTOPE DOSE 11/25/2024 10.1  mCi Final    BH CV STRESS NUCLEAR ISOTOPE DOSE 11/25/2024 32.4  mCi Final    BH CV STRESS PROTOCOL 1 11/25/2024 Pharmacologic   Final    Stage 1 11/25/2024 1.0   Final    HR Stage 1 11/25/2024 94   Final    BP Stage 1 11/25/2024 155/79   Final    Duration Min Stage 1 11/25/2024 0   Final    Duration Sec Stage 1 11/25/2024 10   Final    Stress Dose Regadenoson Stage 1 11/25/2024 0.40   Final    Stress Comments Stage 1 11/25/2024 10 sec bolus injection   Final    Baseline HR 11/25/2024 74  bpm Final    Baseline BP 11/25/2024 164/88  mmHg Final    Peak HR 11/25/2024 94  bpm Final    Peak BP 11/25/2024 155/79  mmHg Final    Recovery HR 11/25/2024 85  bpm Final    Recovery BP 11/25/2024 150/82  mmHg Final    Target HR (85%) 11/25/2024 116  bpm Final    Max. Pred. HR (100%) 11/25/2024 137  bpm Final    Percent Max Pred HR 11/25/2024 68.61  % Final    Percent Target HR 11/25/2024 81  % Final    Nuc Stress EF 11/25/2024 63  % Final        Procedure:       Assessment/Plan:   Hemorrhoid-recommend observation  BPH: Discussed the pathophysiology of BPH and obstruction.  We discussed the static and dynamic effects of BPH as well as using 5 alpha reductase inhibitors versus alpha blockade.  We discussed the indications for transurethral surgery  as well and/ or other therapeutic options available including all of the newer techniques.  PSA testing-I am recommending a PSA blood test that stands for prostate specific antigen.  I discussed the pathophysiology of PSA testing indicating its use in the diagnosis and management of prostate cancer.  I discussed the normal range being 0 to 4, but more appropriately being much closer to 0 to 2 in a normal male.  I discussed the fact that after a certain age we don't recommend PSA testing especially in view of numerous comorbidities, that this will not be a useful test.  I discussed many of the things that can artificially raise PSA including a recent infection, urinary tract infection, and recent sexual intercourse, or even the type of movement such as manipulation of the prostate from riding a bicycle.  After all this is taken into account when the test is reviewed, the most important use of PSA is the velocity measurement.  In other words, the change of PSA with time is a very important factor in the use and that we look for greater than 20% rise over a year to help us make the prediction of prostate cancer.  I also discussed that the use with prostate cancer indicating that after a radical prostatectomy, the PSA should be 0 and any rise indicates an early biochemical recurrence.              This document has been electronically signed by IVETH MINOR MD January 21, 2025 15:02 EST    Dictated Utilizing Dragon Dictation: Part of this note may be an electronic transcription/translation of spoken language to printed text using the Dragon Dictation System.

## 2025-01-22 LAB — PSA SERPL-MCNC: 1.94 NG/ML (ref 0–4)

## 2025-02-10 RX ORDER — DILTIAZEM HYDROCHLORIDE 240 MG/1
240 CAPSULE, COATED, EXTENDED RELEASE ORAL DAILY
Qty: 90 CAPSULE | Refills: 3 | Status: SHIPPED | OUTPATIENT
Start: 2025-02-10

## 2025-02-10 NOTE — TELEPHONE ENCOUNTER
Lab Results   Component Value Date    GLUCOSE 115 (H) 06/04/2024    BUN 27 (H) 06/04/2024    CREATININE 0.92 06/04/2024     06/04/2024    K 4.3 06/04/2024     06/04/2024    CALCIUM 8.5 (L) 06/04/2024    PROTEINTOT 6.5 06/04/2024    ALBUMIN 4.0 06/04/2024    ALT 40 06/04/2024    AST 30 06/04/2024    ALKPHOS 147 (H) 06/04/2024    BILITOT 1.2 06/04/2024    GLOB 2.5 06/04/2024    AGRATIO 1.6 06/04/2024    BCR 29.3 (H) 06/04/2024    ANIONGAP 8.0 06/04/2024    EGFR 83.1 06/04/2024

## 2025-03-12 NOTE — PROGRESS NOTES
[facility]    Encounter Date: 2025    Patient ID: Zachary Melendez is a 84 y.o. male.  : 1940     PCP: Nerissa Cortez MD       Chief Complaint: Acute heart failure with preserved ejection fraction (HFpEF)      PROBLEM LIST:  Coronary artery disease:  NSTEMI, 2016.  LHC, 2016: 80-90% LAD treated with 2.5 x 18 FERNANDO, 50% Cx with normal IFR, 30% RCA. EF 45%.  Echo, 2017: EF 61-65%. Anterior leaflet thickening present. No significant VHD.  Echo, 2024: EF 62%. Moderate to severe TR with RVSP 59 mmHg. Moderate to severe pulmonary hypertension.  MPS, 2024: EF 63%. No evidence of ischemia.  Cardiomyopathy:  EF 45% at the time of NSTEMI in , with subsequent normalization.  Echo, 2017: EF 61-65%.  Persistent Atrial Fibrillation  48-hour holter monitor, 3/30/2021: Frequent PVCs including bigeminy. Occasional PACs. Occasional PAT/SVT up to 15 beats. No symptoms.   14-day event monitor, 2022: Sinus rhythm. Occasional APCs, frequent PVCs. Occasional SVT up to 26 beats. No symptoms.  Holter, 10/24/2023: Persistent atrial fibrillation. Avg rate 74 bpm. Occasional PVCs.   Hypertension.  Dyslipidemia.  GERD.  Obesity.  Nephrolithiasis.  Surgical Hx:  TURP.  Bilateral inguinal repair.  Cholecystectomy.    History of Present Illness  Patient presents today for a follow-up with a history of CAD, CHF, AF, and cardiac risk factors. Since last visit, patient has been doing well overall from a cardiovascular standpoint. Patient maintains a stable activity level by working around the house. He states that he has not been sleeping well due to the spirits in his house. He does not feel dizzy or experience chest pain. Patient denies shortness of breath, orthopnea, palpitations, edema, dizziness, and syncope.       No Known Allergies      Current Outpatient Medications:     allopurinol (ZYLOPRIM) 100 MG tablet, Take 1 tablet by mouth Daily.,  "Disp: , Rfl:     apixaban (ELIQUIS) 2.5 MG tablet tablet, Take 1 tablet by mouth Daily. Pt said he is only taking Eliquis once a day, Disp: , Rfl:     atorvastatin (Lipitor) 40 MG tablet, Take 1 tablet by mouth Daily., Disp: 90 tablet, Rfl: 1    dilTIAZem CD (CARDIZEM CD) 240 MG 24 hr capsule, Take 1 capsule by mouth Daily., Disp: 90 capsule, Rfl: 3    furosemide (LASIX) 20 MG tablet, Take 1 tablet by mouth Daily., Disp: 90 tablet, Rfl: 1    hydrOXYzine pamoate (VISTARIL) 50 MG capsule, Take 1 capsule by mouth Daily., Disp: , Rfl:     metoprolol succinate XL (TOPROL-XL) 100 MG 24 hr tablet, Take 1 tablet by mouth Daily., Disp: 90 tablet, Rfl: 3    aspirin 81 MG EC tablet, Take 1 tablet by mouth Daily. (Patient not taking: Reported on 3/18/2025), Disp: , Rfl:     omeprazole (PriLOSEC) 20 MG capsule, Take 1 capsule by mouth Daily. (Patient not taking: Reported on 3/18/2025), Disp: , Rfl:     The following portions of the patient's history were reviewed and updated as appropriate: allergies, current medications, past family history, past medical history, past social history, past surgical history and problem list.    ROS  Review of Systems   14 point ROS negative except for that listed in the HPI.         Objective:     /80 (BP Location: Right arm, Patient Position: Sitting)   Pulse 74   Ht 177.8 cm (70\")   Wt 85.7 kg (189 lb)   SpO2 97%   BMI 27.12 kg/m²      Physical Exam  General: No apparent distress.  Neck: no JVD.  Chest:No respiratory distress, breath sounds are normal. No wheezes,  rhonchi or rales.  Cardiovascular: Normal S1 and S2, no murmur, gallop or rub.    Extremities: No edema.      Data Review:   Lab Results   Component Value Date    GLUCOSE 115 (H) 06/04/2024    BUN 27 (H) 06/04/2024    CREATININE 0.92 06/04/2024    EGFR 83.1 06/04/2024    BCR 29.3 (H) 06/04/2024     06/04/2024    K 4.3 06/04/2024    CO2 27.0 06/04/2024    CALCIUM 8.5 (L) 06/04/2024    ALBUMIN 4.0 06/04/2024    AST 30 " 06/04/2024    ALT 40 06/04/2024     Lab Results   Component Value Date    CHOL 87 06/04/2024    TRIG 60 06/04/2024    HDL 36 (L) 06/04/2024    LDL 37 06/04/2024        Procedures     Advance Care Planning   ACP discussion was held with the patient during this visit. Patient does not have an advance directive, declines further assistance.           Assessment:      Diagnosis Plan   1. Coronary artery disease involving native coronary artery of native heart without angina pectoris  Stable without angina on current activity.  Continue current medical management.       2. Persistent atrial fibrillation  Stable. Patient was in atrial fibrillation per an EKG on 10/01/2024. However, he has not been experiencing any associated symptoms like dizziness or palpitations. He will continue to be chronically anticoagulated with Eliquis 2.5 mg daily. Continue on diltiazem 240 mg daily and metoprolol 100 mg daily for rate control and hypertension.      3. Essential hypertension  Initiate losartan 50 mg nightly to reduce risk of heart failure and to better control hypertension.  Continue on metoprolol 100 mg daily for control of hypertension.       4. Dyslipidemia  Well controlled. LDL 37 on 06/04/2024. Continue on atorvastatin 40 mg daily for hyperlipidemia.       5. Chronic diastolic congestive heart failure  Stable. Continue on Lasix 20 mg daily for fluid retention. Initiate losartan 50 mg daily to reduce risk of heart failure and to better control hypertension.         Plan:   Stable cardiac status. No current angina or CHF symptoms.  Initiate losartan 50 mg nightly to r optimize CHF GDMT and to better control hypertension.   Continue rest of the other current medications.   FU in 6 MO, sooner as needed.  Thank you for allowing us to participate in the care of your patient.     Scribed for Michelle Frank MD by Bianka Tran. 3/18/2025 10:02 EDT    I, Michelle Frank MD, personally performed the services described in this documentation  as scribed by the above named individual in my presence, and it is both accurate and complete.  3/18/2025  12:54 EDT      Please note that portions of this note may have been completed with a voice recognition program. Efforts were made to edit the dictations, but occasionally words are mistranscribed.

## 2025-03-18 ENCOUNTER — OFFICE VISIT (OUTPATIENT)
Age: 85
End: 2025-03-18
Payer: MEDICARE

## 2025-03-18 VITALS
HEIGHT: 70 IN | WEIGHT: 189 LBS | DIASTOLIC BLOOD PRESSURE: 80 MMHG | HEART RATE: 74 BPM | BODY MASS INDEX: 27.06 KG/M2 | OXYGEN SATURATION: 97 % | SYSTOLIC BLOOD PRESSURE: 146 MMHG

## 2025-03-18 DIAGNOSIS — I25.10 CORONARY ARTERY DISEASE INVOLVING NATIVE CORONARY ARTERY OF NATIVE HEART WITHOUT ANGINA PECTORIS: Primary | ICD-10-CM

## 2025-03-18 DIAGNOSIS — I10 ESSENTIAL HYPERTENSION: ICD-10-CM

## 2025-03-18 DIAGNOSIS — I48.19 PERSISTENT ATRIAL FIBRILLATION: ICD-10-CM

## 2025-03-18 DIAGNOSIS — I50.32 CHRONIC DIASTOLIC CONGESTIVE HEART FAILURE: ICD-10-CM

## 2025-03-18 DIAGNOSIS — E78.5 DYSLIPIDEMIA: ICD-10-CM

## 2025-03-18 RX ORDER — LOSARTAN POTASSIUM 50 MG/1
50 TABLET ORAL NIGHTLY
Qty: 90 TABLET | Refills: 3 | Status: SHIPPED | OUTPATIENT
Start: 2025-03-18

## 2025-06-03 RX ORDER — METOPROLOL SUCCINATE 100 MG/1
100 TABLET, EXTENDED RELEASE ORAL DAILY
Qty: 90 TABLET | Refills: 3 | Status: SHIPPED | OUTPATIENT
Start: 2025-06-03

## 2025-06-03 RX ORDER — ATORVASTATIN CALCIUM 40 MG/1
40 TABLET, FILM COATED ORAL DAILY
Qty: 90 TABLET | Refills: 1 | Status: SHIPPED | OUTPATIENT
Start: 2025-06-03

## 2025-06-03 RX ORDER — FUROSEMIDE 20 MG/1
20 TABLET ORAL DAILY
Qty: 90 TABLET | Refills: 1 | Status: SHIPPED | OUTPATIENT
Start: 2025-06-03